# Patient Record
Sex: FEMALE | Race: BLACK OR AFRICAN AMERICAN | NOT HISPANIC OR LATINO | Employment: FULL TIME | URBAN - METROPOLITAN AREA
[De-identification: names, ages, dates, MRNs, and addresses within clinical notes are randomized per-mention and may not be internally consistent; named-entity substitution may affect disease eponyms.]

---

## 2024-11-02 ENCOUNTER — HOSPITAL ENCOUNTER (EMERGENCY)
Facility: HOSPITAL | Age: 54
Discharge: HOME | End: 2024-11-03
Attending: EMERGENCY MEDICINE
Payer: COMMERCIAL

## 2024-11-02 ENCOUNTER — APPOINTMENT (EMERGENCY)
Dept: RADIOLOGY | Facility: HOSPITAL | Age: 54
End: 2024-11-02
Payer: COMMERCIAL

## 2024-11-02 VITALS
WEIGHT: 215.61 LBS | DIASTOLIC BLOOD PRESSURE: 129 MMHG | TEMPERATURE: 98 F | RESPIRATION RATE: 18 BRPM | SYSTOLIC BLOOD PRESSURE: 251 MMHG | HEART RATE: 73 BPM | OXYGEN SATURATION: 99 %

## 2024-11-02 DIAGNOSIS — M77.8 TENDINITIS OF HAND: ICD-10-CM

## 2024-11-02 DIAGNOSIS — M79.641 RIGHT HAND PAIN: ICD-10-CM

## 2024-11-02 DIAGNOSIS — I10 HYPERTENSION, UNSPECIFIED TYPE: Primary | ICD-10-CM

## 2024-11-02 PROCEDURE — 99284 EMERGENCY DEPT VISIT MOD MDM: CPT | Performed by: EMERGENCY MEDICINE

## 2024-11-02 PROCEDURE — 99283 EMERGENCY DEPT VISIT LOW MDM: CPT

## 2024-11-02 PROCEDURE — 73130 X-RAY EXAM OF HAND: CPT

## 2024-11-02 RX ORDER — AMLODIPINE BESYLATE 5 MG/1
5 TABLET ORAL ONCE
Status: COMPLETED | OUTPATIENT
Start: 2024-11-02 | End: 2024-11-02

## 2024-11-02 RX ORDER — HYDROCODONE BITARTRATE AND ACETAMINOPHEN 5; 325 MG/1; MG/1
1 TABLET ORAL ONCE
Status: COMPLETED | OUTPATIENT
Start: 2024-11-02 | End: 2024-11-02

## 2024-11-02 RX ADMIN — HYDROCODONE BITARTRATE AND ACETAMINOPHEN 1 TABLET: 5; 325 TABLET ORAL at 23:48

## 2024-11-02 RX ADMIN — AMLODIPINE BESYLATE 5 MG: 5 TABLET ORAL at 23:48

## 2024-11-02 NOTE — Clinical Note
Nuvia Marina was seen and treated in our emergency department on 11/2/2024.                Diagnosis:     Nuvia  may return to work on return date.    She may return on this date: 11/06/2024         If you have any questions or concerns, please don't hesitate to call.      Deysi Garcia MD    ______________________________           _______________          _______________  Hospital Representative                              Date                                Time

## 2024-11-03 RX ORDER — OXYCODONE HYDROCHLORIDE 5 MG/1
5 TABLET ORAL EVERY 6 HOURS PRN
Qty: 12 TABLET | Refills: 0 | Status: SHIPPED | OUTPATIENT
Start: 2024-11-03 | End: 2024-11-13

## 2024-11-03 RX ORDER — PREDNISONE 20 MG/1
40 TABLET ORAL DAILY
Qty: 10 TABLET | Refills: 0 | Status: SHIPPED | OUTPATIENT
Start: 2024-11-03 | End: 2024-11-08

## 2024-11-03 NOTE — DISCHARGE INSTRUCTIONS
You were seen and evaluated today for hand pain.  Your test results demonstrated normal XR  Please take all medications as instructed. Follow up with your PCP as discussed.   RETURN TO THE EMERGENCY DEPARTMENT if you develop new or worsening symptoms and are unable to see your PCP.

## 2024-11-03 NOTE — ED PROVIDER NOTES
Time reflects when diagnosis was documented in both MDM as applicable and the Disposition within this note       Time User Action Codes Description Comment    11/3/2024  1:20 AM Deysi Gracia [I10] Hypertension, unspecified type     11/3/2024  1:21 AM Deysi Garcia [M77.8] Tendinitis of hand     11/3/2024  1:21 AM Deysi Garcia [M79.641] Right hand pain           ED Disposition       ED Disposition   Discharge    Condition   Stable    Date/Time   Sun Nov 3, 2024  1:21 AM    Comment   Nuvia Marina discharge to home/self care.                   Assessment & Plan       Medical Decision Making  Patient is a pleasant 54-year-old female who presents to the emergency department with a complaint of right hand pain and injury as well as hypertension.  She is denies any injury or trauma but states that she was out using a leaf blower which is an unusual activity for her today.  Her  supplements the history and states that she was doing work around the home and yard all day today.  Range of motion in the right thumb is limited due to pain.  She denies overt injury or trauma.  Strongly suspect tendinitis versus tenosynovitis.  Will obtain x-ray to evaluate for bony injury though unlikely based on the history.  Mechanism not suggestive of ligamentous rupture.    Patient also notes hypertension above her baseline.  She states that she does typically live in the 170s to 180s systolic.  This is despite compliance with multiple antihypertensive medications.  Strongly suspect pain is contributing to her hypertension.  She did take a dose of her home metoprolol prior to presentation in the emergency department.  Will provide analgesia as well as an extra dose of home amlodipine.  She denies any symptoms suggestive of hypertensive urgency or emergency.    Amount and/or Complexity of Data Reviewed  Radiology: ordered and independent interpretation performed.     Details: No fracture  identified.     Risk  Prescription drug management.  Decision regarding hospitalization.        ED Course as of 11/03/24 0159   Sat Nov 02, 2024   2342 Blood Pressure(!): 251/129  Improved to 226 systolic on my eval.   Patient notes baseline 170-180s systolic despite 4 medications.    Sun Nov 03, 2024   0121 Prolonged discussion re HTN with patient. Denies any symptoms suggestive of hypertensive urgency or emergency.   Strict return precautions provided.    0127 Thumb spica applied       Medications   HYDROcodone-acetaminophen (NORCO) 5-325 mg per tablet 1 tablet (1 tablet Oral Given 11/2/24 2348)   amLODIPine (NORVASC) tablet 5 mg (5 mg Oral Given 11/2/24 2348)       ED Risk Strat Scores                                               History of Present Illness       Chief Complaint   Patient presents with    Hand Injury     Pt was using a leaf blower today around 2pm and after noticed that her right thumb was swollen and painful. Pt used biofreeze on it and it helped for a couple of hours, but it has gotten worse now.    Hypertension       Past Medical History:   Diagnosis Date    Hypertelorism       History reviewed. No pertinent surgical history.   History reviewed. No pertinent family history.   Social History     Tobacco Use    Smoking status: Never    Smokeless tobacco: Never   Vaping Use    Vaping status: Never Used   Substance Use Topics    Alcohol use: Never    Drug use: Never      E-Cigarette/Vaping    E-Cigarette Use Never User       E-Cigarette/Vaping Substances      I have reviewed and agree with the history as documented.     The patient is a 54 yoF w/ hand pain and high blood pressure increased from her baseline.           Review of Systems   Musculoskeletal:  Positive for joint swelling.   Skin:  Negative for color change, pallor, rash and wound.   Neurological:  Negative for weakness and numbness.           Objective       ED Triage Vitals   Temperature Pulse Blood Pressure Respirations SpO2 Patient  Position - Orthostatic VS   11/02/24 2257 11/02/24 2257 11/02/24 2257 11/02/24 2257 11/02/24 2257 11/02/24 2257   98 °F (36.7 °C) 73 (!) 251/129 18 99 % Sitting      Temp Source Heart Rate Source BP Location FiO2 (%) Pain Score    11/02/24 2257 11/02/24 2257 11/02/24 2257 -- 11/02/24 2348    Oral Monitor Left arm  7      Vitals      Date and Time Temp Pulse SpO2 Resp BP Pain Score FACES Pain Rating User   11/02/24 2348 -- -- -- -- -- 7 -- CHAD   11/02/24 2257 98 °F (36.7 °C) 73 99 % 18 251/129 -- -- WT            Physical Exam  Vitals and nursing note reviewed.   Constitutional:       General: She is not in acute distress.     Appearance: Normal appearance.   HENT:      Head: Normocephalic and atraumatic.      Right Ear: External ear normal.      Left Ear: External ear normal.      Nose: Nose normal.   Cardiovascular:      Rate and Rhythm: Normal rate and regular rhythm.      Pulses: Normal pulses.   Pulmonary:      Effort: Pulmonary effort is normal. No respiratory distress.   Musculoskeletal:      Right hand: Swelling, tenderness and bony tenderness present. Decreased range of motion. Normal sensation. There is no disruption of two-point discrimination. Normal capillary refill. Normal pulse.   Skin:     General: Skin is warm and dry.   Neurological:      General: No focal deficit present.      Mental Status: She is alert and oriented to person, place, and time.   Psychiatric:         Behavior: Behavior normal.         Results Reviewed       None            XR hand 3+ views RIGHT    (Results Pending)       Procedures    ED Medication and Procedure Management   None     Patient's Medications   Discharge Prescriptions    OXYCODONE (ROXICODONE) 5 IMMEDIATE RELEASE TABLET    Take 1 tablet (5 mg total) by mouth every 6 (six) hours as needed for moderate pain for up to 10 days Max Daily Amount: 20 mg       Start Date: 11/3/2024 End Date: 11/13/2024       Order Dose: 5 mg       Quantity: 12 tablet    Refills: 0     PREDNISONE 20 MG TABLET    Take 2 tablets (40 mg total) by mouth daily for 5 days       Start Date: 11/3/2024 End Date: 11/8/2024       Order Dose: 40 mg       Quantity: 10 tablet    Refills: 0       ED SEPSIS DOCUMENTATION   Time reflects when diagnosis was documented in both MDM as applicable and the Disposition within this note       Time User Action Codes Description Comment    11/3/2024  1:20 AM Deysi Garcia [I10] Hypertension, unspecified type     11/3/2024  1:21 AM Deysi Garcia [M77.8] Tendinitis of hand     11/3/2024  1:21 AM Deysi Garcia [M79.641] Right hand pain                  Deysi Garcia MD  11/03/24 0159

## 2024-11-05 ENCOUNTER — OFFICE VISIT (OUTPATIENT)
Dept: OBGYN CLINIC | Facility: CLINIC | Age: 54
End: 2024-11-05
Payer: COMMERCIAL

## 2024-11-05 VITALS — WEIGHT: 215 LBS | BODY MASS INDEX: 30.1 KG/M2 | HEIGHT: 71 IN

## 2024-11-05 DIAGNOSIS — M65.4 DE QUERVAIN'S TENOSYNOVITIS, RIGHT: Primary | ICD-10-CM

## 2024-11-05 DIAGNOSIS — M25.531 PAIN IN RIGHT WRIST: ICD-10-CM

## 2024-11-05 PROCEDURE — 20550 NJX 1 TENDON SHEATH/LIGAMENT: CPT | Performed by: STUDENT IN AN ORGANIZED HEALTH CARE EDUCATION/TRAINING PROGRAM

## 2024-11-05 PROCEDURE — 99204 OFFICE O/P NEW MOD 45 MIN: CPT | Performed by: STUDENT IN AN ORGANIZED HEALTH CARE EDUCATION/TRAINING PROGRAM

## 2024-11-05 RX ORDER — LIDOCAINE HYDROCHLORIDE 10 MG/ML
1 INJECTION, SOLUTION INFILTRATION; PERINEURAL
Status: COMPLETED | OUTPATIENT
Start: 2024-11-05 | End: 2024-11-05

## 2024-11-05 RX ORDER — BETAMETHASONE SODIUM PHOSPHATE AND BETAMETHASONE ACETATE 3; 3 MG/ML; MG/ML
6 INJECTION, SUSPENSION INTRA-ARTICULAR; INTRALESIONAL; INTRAMUSCULAR; SOFT TISSUE
Status: COMPLETED | OUTPATIENT
Start: 2024-11-05 | End: 2024-11-05

## 2024-11-05 RX ORDER — AMLODIPINE BESYLATE 5 MG/1
5 TABLET ORAL DAILY
COMMUNITY

## 2024-11-05 RX ORDER — LOSARTAN POTASSIUM 100 MG/1
TABLET ORAL
COMMUNITY
Start: 2024-10-22

## 2024-11-05 RX ORDER — METOPROLOL TARTRATE 25 MG/1
TABLET, FILM COATED ORAL
COMMUNITY
Start: 2024-10-22

## 2024-11-05 RX ADMIN — LIDOCAINE HYDROCHLORIDE 1 ML: 10 INJECTION, SOLUTION INFILTRATION; PERINEURAL at 13:00

## 2024-11-05 RX ADMIN — BETAMETHASONE SODIUM PHOSPHATE AND BETAMETHASONE ACETATE 6 MG: 3; 3 INJECTION, SUSPENSION INTRA-ARTICULAR; INTRALESIONAL; INTRAMUSCULAR; SOFT TISSUE at 13:00

## 2024-11-05 NOTE — PROGRESS NOTES
ORTHOPAEDIC HAND, WRIST, AND ELBOW OFFICE  VISIT     ASSESSMENT/PLAN:    Nuvia Marina is a 54 y.o. RHD female who presents with De Quervain's tenosynovitis of the right wrist s/p CSI 11/5/24    -  We discussed the natural history and etiology of this condition detail. We discussed the utility of therapy vs. injection vs. surgery. In this case, the risks of surgery involve infection, persistent pain, and recurrence of the condition.       -  The patient elected to receive a steroid injection as per procedure note below.  The patient can continue use of the brace provided by the ED for an additional 2 weeks. After 2 weeks, they may wear it at night for sleeping.    -  The patient will follow up with us on a as needed basis should the symptoms worsen or persist.             The patient verbalized understanding of exam findings and treatment plan. We engaged in the shared decision-making process and treatment options were discussed at length with the patient. Surgical and conservative management discussed today along with risks and benefits.    Follow Up:  PRN      General Discussions:  De Quervain Tenosynovitis: The anatomy and physiology of de Quervain's tenosynovitis was discussed with the patient today in the office.  Edema and increased contact pressure within the first dorsal extensor compartment at the radial styloid can cause pain, crepitation, and limitation of function.  Treatment options include resting thumb spica splints to decrease edema, oral anti-inflammatory medications, home or formal therapy exercises, up to 2 steroid injections within the first dorsal extensor compartment, or surgical release.  While majority of patients do respond to conservative treatment, up to 20% may require surgical release.       Operative Discussions:  De Quervain Release:   The anatomy and physiology of de Quervain's tenosynovitis was discussed with the patient today in the office.  Edema and increased contact  pressure within the first dorsal extensor compartment at the radial styloid can cause pain, crepitation, and limitation of function.  Treatment options include resting thumb spica splints to decrease edema, oral anti-inflammatory medications, home or formal therapy exercises, up to 2 steroid injections within the first dorsal extensor compartment, or surgical release.  While majority of patients do respond to conservative treatment, up to 20% may require surgical release. The patient has elected to undergo a release of the first dorsal extensor compartment (de Quervain's).  A small incision will be made over the radial styloid of the wrist, the tendon sheath holding the abductor pollicis longus and extensor pollicis brevis will be released.  In the postoperative period, light activities are allowed immediately, driving is allowed when narcotic medication has stopped, and the incision may get wet after 2 days.  Heavy activities (lifting more than approximately 10 pounds) will be allowed after the follow up appointment in 1-2 weeks.  While the pain and discomfort within the wrist typically improves rapidly, some residual discomfort may be present for up to 6 weeks.  The patient may notice temporary numbness within the superficial sensory branch of the radial nerve secondary to a traction neuropraxia.  This is often a self-limiting condition.  The patient has an understanding of the above mentioned discussion.  Approximate success rate is 98%.The risks and benefits of the procedure were explained to the patient, which include, but are not limited to: Bleeding, infection, recurrence, pain, scar, damage to tendons, damage to nerves, and damage to blood vessels, failure to give desired results and complications related to anesthesia.  These risks, along with alternative conservative treatment options, and postoperative protocols were voiced back and understood by the patient.  All questions were answered to the patient's  satisfaction.  The patient agrees to comply with a standard postoperative protocol, and is willing to proceed.  Education was provided via written and auditory forms.  There were no barriers to learning. Written handouts regarding wound care, incision and scar care, and general preoperative information was provided to the patient.  Prior to surgery, the patient may be requested to stop all anti-inflammatory medications.  Prophylactic aspirin, Plavix, and Coumadin may be allowed to be continued.  Medications including vitamin E., ginkgo, and fish oil are requested to be stopped approximately one week prior to surgery.  Hypertensive medications and beta blockers, if taken, should be continued.      ____________________________________________________________________________________________________________________________________________      CHIEF COMPLAINT:  Right hand pain    SUBJECTIVE:  Nuvia Marina is a 54 y.o. female who presents with sharp pain in the right wrist.  This started 3 days ago as Sudden without injury.  Movement of the right thumb aggravates her wrist pain.  Radiation: Yes to the  forearm  Previous Treatments: NSAIDs, activity modification, and bracing with relief  Associated symptoms: None  Handedness: right  Work status: Nurse    I have personally reviewed all the relevant PMH, PSH, SH, FH, Medications and allergies      PAST MEDICAL HISTORY:  Past Medical History:   Diagnosis Date    Hypertelorism        PAST SURGICAL HISTORY:  History reviewed. No pertinent surgical history.    FAMILY HISTORY:  History reviewed. No pertinent family history.    SOCIAL HISTORY:  Social History     Tobacco Use    Smoking status: Never    Smokeless tobacco: Never   Vaping Use    Vaping status: Never Used   Substance Use Topics    Alcohol use: Never    Drug use: Never       MEDICATIONS:    Current Outpatient Medications:     amLODIPine (NORVASC) 5 mg tablet, Take 5 mg by mouth daily, Disp: , Rfl:     losartan  "(COZAAR) 100 MG tablet, , Disp: , Rfl:     metoprolol tartrate (LOPRESSOR) 25 mg tablet, , Disp: , Rfl:     oxyCODONE (Roxicodone) 5 immediate release tablet, Take 1 tablet (5 mg total) by mouth every 6 (six) hours as needed for moderate pain for up to 10 days Max Daily Amount: 20 mg, Disp: 12 tablet, Rfl: 0    predniSONE 20 mg tablet, Take 2 tablets (40 mg total) by mouth daily for 5 days, Disp: 10 tablet, Rfl: 0    ALLERGIES:  No Known Allergies        REVIEW OF SYSTEMS:  Pertinent items are noted in HPI.  A comprehensive review of systems was negative.    VITALS:  There were no vitals filed for this visit.    LABS:  HgA1c: No results found for: \"HGBA1C\"  BMP: No results found for: \"GLUCOSE\", \"CALCIUM\", \"NA\", \"K\", \"CO2\", \"CL\", \"BUN\", \"CREATININE\"    _____________________________________________________  PHYSICAL EXAMINATION:  General: well developed and well nourished, alert, oriented times 3, and appears comfortable  Psychiatric: Normal  HEENT: Normocephalic, Atraumatic Trachea Midline, No torticollis  Pulmonary: No audible wheezing or respiratory distress   Abdomen/GI: Non tender, non distended   Cardiovascular: No pitting edema, 2+ radial pulse   Skin: No masses, erythema, lacerations, fluctation, ulcerations  Neurovascular: Sensation Intact to the Median, Ulnar, Radial Nerve, Motor Intact to the Median, Ulnar, Radial Nerve, and Pulses Intact  Musculoskeletal: Normal, except as noted in detailed exam and in HPI.        FOCUSED MUSCULOSKELETAL EXAMINATION:  Right Upper Extremity  Inspection: skin intact, no notable deformity   Palpation: first dorsal extensor compartment  Neurologic: 5/5 elbow flexion, 5/5 elbow extension, 5/5 wrist extension, 5/5 wrist flexion, 5/5 finger flexion, 5/5 finger extension, 5/5 FPL, 5/5 EPL, 5/5 APB, 5/5 intrinsics, sensation intact to median, radial, and ulnar nerve distributions  Vascular: Palpable radial pulse, brisk cap refill <2sec, hand warm and well perfused  MSK:   MMT: 5/5 " "throughout  Mild soft tissue swelling within the first dorsal extensor compartment  Full FDS, FDP, extensor mechanisms are intact  Positive Finklestein  Demonstrates normal wrist, elbow, and shoulder motion  Forearm compartments are soft and supple  2+ Distal radial pulse with brisk capillary refill to the fingers  Radial, median, ulnar motor and sensory distribution intact  Sensation to light touch intact distally   ___________________________________________________  STUDIES REVIEWED:  Xrays of the right hand were obtained on 11/2/2024 were independently reviewed which demonstrates no acute osseous abnormalities or significant degenerative changes.      LABS REVIEWED:    HgA1c: No results found for: \"HGBA1C\"  BMP: No results found for: \"GLUCOSE\", \"CALCIUM\", \"NA\", \"K\", \"CO2\", \"CL\", \"BUN\", \"CREATININE\"            PROCEDURES PERFORMED:  Hand/upper extremity injection: R extensor compartment 1  Universal Protocol:  Consent: Verbal consent obtained.  Risks and benefits: risks, benefits and alternatives were discussed  Consent given by: patient  Time out: Immediately prior to procedure a \"time out\" was called to verify the correct patient, procedure, equipment, support staff and site/side marked as required.  Patient understanding: patient states understanding of the procedure being performed  Site marked: the operative site was marked  Patient identity confirmed: verbally with patient  Supporting Documentation  Indications: pain and therapeutic   Procedure Details  Condition:de Quervain's tenosynovitis Site: R extensor compartment 1   Preparation: Patient was prepped and draped in the usual sterile fashion  Needle size: 25 G  Approach: dorsal  Medications administered: 1 mL lidocaine 1 %; 6 mg betamethasone acetate-betamethasone sodium phosphate 6 (3-3) mg/mL  Patient tolerance: patient tolerated the procedure well with no immediate complications  Dressing:  Sterile dressing applied   "         _____________________________________________________      Scribe Attestation      I,:  Charity Senait am acting as a scribe while in the presence of the attending physician.:       I,:  Jose Naidu MD personally performed the services described in this documentation    as scribed in my presence.:               I agree with the history, physical examination, assessment and plan of care as documented above.    Jose Naidu M.D.  Attending, Orthopaedic Surgery  Hand, Wrist, and Elbow Surgery  [facility]

## 2024-11-05 NOTE — LETTER
November 5, 2024     Patient: Nuvia Marina  YOB: 1970  Date of Visit: 11/5/2024      To Whom it May Concern:    Nuvia Marina is under my professional care. Nuvia was seen in my office on 11/5/2024. Nuvia may return to work on 11/11/2024 .    If you have any questions or concerns, please don't hesitate to call.         Sincerely,          Jose Naidu MD        CC: No Recipients

## 2025-02-22 ENCOUNTER — HOSPITAL ENCOUNTER (INPATIENT)
Facility: HOSPITAL | Age: 55
LOS: 2 days | Discharge: HOME | End: 2025-02-24
Attending: STUDENT IN AN ORGANIZED HEALTH CARE EDUCATION/TRAINING PROGRAM | Admitting: HOSPITALIST
Payer: COMMERCIAL

## 2025-02-22 ENCOUNTER — APPOINTMENT (EMERGENCY)
Dept: RADIOLOGY | Facility: HOSPITAL | Age: 55
End: 2025-02-22
Payer: COMMERCIAL

## 2025-02-22 ENCOUNTER — APPOINTMENT (EMERGENCY)
Dept: CT IMAGING | Facility: HOSPITAL | Age: 55
End: 2025-02-22
Payer: COMMERCIAL

## 2025-02-22 DIAGNOSIS — M65.90 TENOSYNOVITIS: ICD-10-CM

## 2025-02-22 DIAGNOSIS — L03.90 CELLULITIS: ICD-10-CM

## 2025-02-22 DIAGNOSIS — M65.949 FLEXOR TENOSYNOVITIS OF FINGER: ICD-10-CM

## 2025-02-22 DIAGNOSIS — M79.89 HAND SWELLING: Primary | ICD-10-CM

## 2025-02-22 LAB
ALBUMIN SERPL BCG-MCNC: 4.4 G/DL (ref 3.5–5)
ALP SERPL-CCNC: 106 U/L (ref 34–104)
ALT SERPL W P-5'-P-CCNC: 21 U/L (ref 7–52)
ANION GAP SERPL CALCULATED.3IONS-SCNC: 7 MMOL/L (ref 4–13)
AST SERPL W P-5'-P-CCNC: 23 U/L (ref 13–39)
BASOPHILS # BLD AUTO: 0.07 THOUSANDS/ΜL (ref 0–0.1)
BASOPHILS NFR BLD AUTO: 1 % (ref 0–1)
BILIRUB SERPL-MCNC: 0.65 MG/DL (ref 0.2–1)
BUN SERPL-MCNC: 19 MG/DL (ref 5–25)
CALCIUM SERPL-MCNC: 10.3 MG/DL (ref 8.4–10.2)
CHLORIDE SERPL-SCNC: 103 MMOL/L (ref 96–108)
CO2 SERPL-SCNC: 29 MMOL/L (ref 21–32)
CREAT SERPL-MCNC: 1.11 MG/DL (ref 0.6–1.3)
EOSINOPHIL # BLD AUTO: 0.13 THOUSAND/ΜL (ref 0–0.61)
EOSINOPHIL NFR BLD AUTO: 2 % (ref 0–6)
ERYTHROCYTE [DISTWIDTH] IN BLOOD BY AUTOMATED COUNT: 13.5 % (ref 11.6–15.1)
GFR SERPL CREATININE-BSD FRML MDRD: 56 ML/MIN/1.73SQ M
GLUCOSE SERPL-MCNC: 88 MG/DL (ref 65–140)
HCT VFR BLD AUTO: 37.3 % (ref 34.8–46.1)
HGB BLD-MCNC: 11.9 G/DL (ref 11.5–15.4)
IMM GRANULOCYTES # BLD AUTO: 0.01 THOUSAND/UL (ref 0–0.2)
IMM GRANULOCYTES NFR BLD AUTO: 0 % (ref 0–2)
LACTATE SERPL-SCNC: 0.9 MMOL/L (ref 0.5–2)
LYMPHOCYTES # BLD AUTO: 2.46 THOUSANDS/ΜL (ref 0.6–4.47)
LYMPHOCYTES NFR BLD AUTO: 32 % (ref 14–44)
MCH RBC QN AUTO: 25.8 PG (ref 26.8–34.3)
MCHC RBC AUTO-ENTMCNC: 31.9 G/DL (ref 31.4–37.4)
MCV RBC AUTO: 81 FL (ref 82–98)
MONOCYTES # BLD AUTO: 0.58 THOUSAND/ΜL (ref 0.17–1.22)
MONOCYTES NFR BLD AUTO: 8 % (ref 4–12)
NEUTROPHILS # BLD AUTO: 4.44 THOUSANDS/ΜL (ref 1.85–7.62)
NEUTS SEG NFR BLD AUTO: 57 % (ref 43–75)
NRBC BLD AUTO-RTO: 0 /100 WBCS
PLATELET # BLD AUTO: 333 THOUSANDS/UL (ref 149–390)
PMV BLD AUTO: 9.4 FL (ref 8.9–12.7)
POTASSIUM SERPL-SCNC: 3.8 MMOL/L (ref 3.5–5.3)
PROCALCITONIN SERPL-MCNC: <0.05 NG/ML
PROT SERPL-MCNC: 7.8 G/DL (ref 6.4–8.4)
RBC # BLD AUTO: 4.62 MILLION/UL (ref 3.81–5.12)
SODIUM SERPL-SCNC: 139 MMOL/L (ref 135–147)
WBC # BLD AUTO: 7.69 THOUSAND/UL (ref 4.31–10.16)

## 2025-02-22 PROCEDURE — 36415 COLL VENOUS BLD VENIPUNCTURE: CPT | Performed by: STUDENT IN AN ORGANIZED HEALTH CARE EDUCATION/TRAINING PROGRAM

## 2025-02-22 PROCEDURE — 80053 COMPREHEN METABOLIC PANEL: CPT | Performed by: STUDENT IN AN ORGANIZED HEALTH CARE EDUCATION/TRAINING PROGRAM

## 2025-02-22 PROCEDURE — 96375 TX/PRO/DX INJ NEW DRUG ADDON: CPT

## 2025-02-22 PROCEDURE — 73130 X-RAY EXAM OF HAND: CPT

## 2025-02-22 PROCEDURE — 99284 EMERGENCY DEPT VISIT MOD MDM: CPT

## 2025-02-22 PROCEDURE — 84145 PROCALCITONIN (PCT): CPT | Performed by: STUDENT IN AN ORGANIZED HEALTH CARE EDUCATION/TRAINING PROGRAM

## 2025-02-22 PROCEDURE — 83605 ASSAY OF LACTIC ACID: CPT | Performed by: STUDENT IN AN ORGANIZED HEALTH CARE EDUCATION/TRAINING PROGRAM

## 2025-02-22 PROCEDURE — 99285 EMERGENCY DEPT VISIT HI MDM: CPT | Performed by: STUDENT IN AN ORGANIZED HEALTH CARE EDUCATION/TRAINING PROGRAM

## 2025-02-22 PROCEDURE — 96374 THER/PROPH/DIAG INJ IV PUSH: CPT

## 2025-02-22 PROCEDURE — 73201 CT UPPER EXTREMITY W/DYE: CPT

## 2025-02-22 PROCEDURE — 85025 COMPLETE CBC W/AUTO DIFF WBC: CPT | Performed by: STUDENT IN AN ORGANIZED HEALTH CARE EDUCATION/TRAINING PROGRAM

## 2025-02-22 RX ORDER — KETOROLAC TROMETHAMINE 30 MG/ML
15 INJECTION, SOLUTION INTRAMUSCULAR; INTRAVENOUS ONCE
Status: COMPLETED | OUTPATIENT
Start: 2025-02-22 | End: 2025-02-22

## 2025-02-22 RX ADMIN — AMPICILLIN AND SULBACTAM 3 G: 10; 5 INJECTION, POWDER, FOR SOLUTION INTRAVENOUS at 22:47

## 2025-02-22 RX ADMIN — KETOROLAC TROMETHAMINE 15 MG: 30 INJECTION, SOLUTION INTRAMUSCULAR; INTRAVENOUS at 17:07

## 2025-02-22 NOTE — LETTER
[facility] 2ND FLOOR MED SURG UNIT  [address]  [place] [address]  Dept: [phone]    February 24, 2025     Patient: Nuvia Marina   YOB: 1970   Date of Visit: 2/22/2025       To Whom it May Concern:    Nuvia Marina is under my professional care. She was seen in the hospital from 2/22/2025 to 02/24/25. She may return to work on 2/28 or sooner if able without limitations.    If you have any questions or concerns, please don't hesitate to call.         Sincerely,          Michael Payan MD

## 2025-02-23 PROBLEM — I10 HTN (HYPERTENSION): Status: ACTIVE | Noted: 2020-11-09

## 2025-02-23 PROBLEM — M65.90 TENOSYNOVITIS: Status: ACTIVE | Noted: 2025-02-23

## 2025-02-23 PROBLEM — L03.90 CELLULITIS: Status: ACTIVE | Noted: 2025-02-23

## 2025-02-23 LAB
ANION GAP SERPL CALCULATED.3IONS-SCNC: 8 MMOL/L (ref 4–13)
BUN SERPL-MCNC: 18 MG/DL (ref 5–25)
CALCIUM SERPL-MCNC: 10.6 MG/DL (ref 8.4–10.2)
CHLORIDE SERPL-SCNC: 103 MMOL/L (ref 96–108)
CO2 SERPL-SCNC: 29 MMOL/L (ref 21–32)
CREAT SERPL-MCNC: 1.09 MG/DL (ref 0.6–1.3)
ERYTHROCYTE [DISTWIDTH] IN BLOOD BY AUTOMATED COUNT: 13.3 % (ref 11.6–15.1)
GFR SERPL CREATININE-BSD FRML MDRD: 57 ML/MIN/1.73SQ M
GLUCOSE SERPL-MCNC: 101 MG/DL (ref 65–140)
HCT VFR BLD AUTO: 38.4 % (ref 34.8–46.1)
HGB BLD-MCNC: 12.4 G/DL (ref 11.5–15.4)
MCH RBC QN AUTO: 25.8 PG (ref 26.8–34.3)
MCHC RBC AUTO-ENTMCNC: 32.3 G/DL (ref 31.4–37.4)
MCV RBC AUTO: 80 FL (ref 82–98)
PLATELET # BLD AUTO: 365 THOUSANDS/UL (ref 149–390)
PMV BLD AUTO: 9.7 FL (ref 8.9–12.7)
POTASSIUM SERPL-SCNC: 3.8 MMOL/L (ref 3.5–5.3)
RBC # BLD AUTO: 4.81 MILLION/UL (ref 3.81–5.12)
SODIUM SERPL-SCNC: 140 MMOL/L (ref 135–147)
WBC # BLD AUTO: 6.55 THOUSAND/UL (ref 4.31–10.16)

## 2025-02-23 PROCEDURE — 99222 1ST HOSP IP/OBS MODERATE 55: CPT | Performed by: PHYSICIAN ASSISTANT

## 2025-02-23 PROCEDURE — 85027 COMPLETE CBC AUTOMATED: CPT

## 2025-02-23 PROCEDURE — 99223 1ST HOSP IP/OBS HIGH 75: CPT

## 2025-02-23 PROCEDURE — 80048 BASIC METABOLIC PNL TOTAL CA: CPT

## 2025-02-23 RX ORDER — HYDROXYZINE HYDROCHLORIDE 25 MG/1
25 TABLET, FILM COATED ORAL EVERY 6 HOURS PRN
Status: DISCONTINUED | OUTPATIENT
Start: 2025-02-23 | End: 2025-02-24 | Stop reason: HOSPADM

## 2025-02-23 RX ORDER — HYDROCHLOROTHIAZIDE 25 MG/1
25 TABLET ORAL 3 TIMES DAILY
Status: DISCONTINUED | OUTPATIENT
Start: 2025-02-23 | End: 2025-02-24 | Stop reason: HOSPADM

## 2025-02-23 RX ORDER — ACETAMINOPHEN 325 MG/1
650 TABLET ORAL EVERY 6 HOURS SCHEDULED
Status: DISCONTINUED | OUTPATIENT
Start: 2025-02-23 | End: 2025-02-24 | Stop reason: HOSPADM

## 2025-02-23 RX ORDER — HYDROCHLOROTHIAZIDE 25 MG/1
25 TABLET ORAL 3 TIMES DAILY
Status: DISCONTINUED | OUTPATIENT
Start: 2025-02-23 | End: 2025-02-23

## 2025-02-23 RX ORDER — AMLODIPINE BESYLATE 5 MG/1
5 TABLET ORAL DAILY
Status: DISCONTINUED | OUTPATIENT
Start: 2025-02-23 | End: 2025-02-24 | Stop reason: HOSPADM

## 2025-02-23 RX ORDER — VANCOMYCIN HYDROCHLORIDE 750 MG/150ML
750 INJECTION, SOLUTION INTRAVENOUS EVERY 12 HOURS
Status: DISCONTINUED | OUTPATIENT
Start: 2025-02-23 | End: 2025-02-24

## 2025-02-23 RX ORDER — HYDROCHLOROTHIAZIDE 25 MG/1
25 TABLET ORAL 3 TIMES DAILY
COMMUNITY
Start: 2024-11-16

## 2025-02-23 RX ORDER — HYDROMORPHONE HCL IN WATER/PF 6 MG/30 ML
0.2 PATIENT CONTROLLED ANALGESIA SYRINGE INTRAVENOUS EVERY 6 HOURS PRN
Status: DISCONTINUED | OUTPATIENT
Start: 2025-02-23 | End: 2025-02-24 | Stop reason: HOSPADM

## 2025-02-23 RX ORDER — ACETAMINOPHEN 325 MG/1
650 TABLET ORAL EVERY 6 HOURS PRN
Status: DISCONTINUED | OUTPATIENT
Start: 2025-02-23 | End: 2025-02-23

## 2025-02-23 RX ORDER — OXYCODONE HYDROCHLORIDE 5 MG/1
5 TABLET ORAL EVERY 6 HOURS PRN
Refills: 0 | Status: DISCONTINUED | OUTPATIENT
Start: 2025-02-23 | End: 2025-02-24 | Stop reason: HOSPADM

## 2025-02-23 RX ORDER — ENALAPRIL MALEATE 20 MG/1
20 TABLET ORAL DAILY
COMMUNITY

## 2025-02-23 RX ORDER — LISINOPRIL 20 MG/1
40 TABLET ORAL DAILY
Status: DISCONTINUED | OUTPATIENT
Start: 2025-02-23 | End: 2025-02-24 | Stop reason: HOSPADM

## 2025-02-23 RX ADMIN — CEFTRIAXONE SODIUM 1000 MG: 10 INJECTION, POWDER, FOR SOLUTION INTRAVENOUS at 09:31

## 2025-02-23 RX ADMIN — AMLODIPINE BESYLATE 5 MG: 5 TABLET ORAL at 09:21

## 2025-02-23 RX ADMIN — HYDROCHLOROTHIAZIDE 25 MG: 25 TABLET ORAL at 21:14

## 2025-02-23 RX ADMIN — HYDROCHLOROTHIAZIDE 25 MG: 25 TABLET ORAL at 09:21

## 2025-02-23 RX ADMIN — VANCOMYCIN HYDROCHLORIDE 1250 MG: 1 INJECTION, POWDER, LYOPHILIZED, FOR SOLUTION INTRAVENOUS at 00:57

## 2025-02-23 RX ADMIN — LISINOPRIL 40 MG: 20 TABLET ORAL at 09:21

## 2025-02-23 RX ADMIN — VANCOMYCIN HYDROCHLORIDE 750 MG: 750 INJECTION, SOLUTION INTRAVENOUS at 15:00

## 2025-02-23 RX ADMIN — ACETAMINOPHEN 650 MG: 325 TABLET, FILM COATED ORAL at 18:17

## 2025-02-23 RX ADMIN — HYDROCHLOROTHIAZIDE 25 MG: 25 TABLET ORAL at 16:18

## 2025-02-23 RX ADMIN — ACETAMINOPHEN 650 MG: 325 TABLET, FILM COATED ORAL at 12:36

## 2025-02-23 NOTE — ASSESSMENT & PLAN NOTE
"Presents to ED due to worsening swelling and pain in left hand since Monday  Was seen by PCP on Wednesday and prescribed p.o. Augmentin, reports to ED today due to worsening of pain/swelling. Denies fever/chills  CT left upper extremity revealing \"Tenosynovitis of the fourth extensor compartment. Subcutaneous edema along the dorsum of the hand suggesting cellulitis.\"  BP elevated since arrival otherwise VSS, labs WNL  Per ER report, hand surgery recommending admission for treatment of infection with a.m. consult  Received 3 g IV Unasyn in ED; will initiate IV ceftriaxone and vancomycin  Diet n.p.o., will defer VTE prophylaxis at this time until decision is made if patient will require surgery in a.m.  Tylenol for pain/fever, oxycodone 2.5mg/5mg for moderate/severe, dilaudid 0.2mg for breakthrough, pain control precautions initiated    "

## 2025-02-23 NOTE — PROGRESS NOTES
Nuvia Marina is a 55 y.o. female who is currently ordered Vancomycin IV with management by the Pharmacy Consult service.  Relevant clinical data and objective / subjective history reviewed.  Vancomycin Assessment:  Indication and Goal AUC/Trough: Soft tissue (goal -600, trough >10), -600, trough >10  Clinical Status:  New start  Micro:   pending  Renal Function:  SCr: 1.11 mg/dL  CrCl: 73.8 mL/min  Renal replacement: Not on dialysis  Days of Therapy: 1  Current Dose: 1250mg Loading dose  Vancomycin Plan:  New Dosinmg Q12H  Estimated AUC: 407 mcg*hr/mL  Estimated Trough: 12.7 mcg/mL  Next Level: 25  Renal Function Monitoring: Daily BMP and UOP  Pharmacy will continue to follow closely for s/sx of nephrotoxicity, infusion reactions and appropriateness of therapy.  BMP and CBC will be ordered per protocol. We will continue to follow the patient’s culture results and clinical progress daily.    Dwayne Baires, Pharmacist

## 2025-02-23 NOTE — ASSESSMENT & PLAN NOTE
Left hand extensor tenosynovitis with underlying cellulitis  It was discussed with the patient to continue with IV antibiotics as there was improvement in the swelling since her hospital stay  Aqua K-pad can be applied to the area  She was encouraged to work on range of motion of the digits as well as the wrist  Analgesics as per primary team  DVT prophylaxis as per primary team  Dispo: Orthopedics will continue to follow, if she continues to have improvement, she can be discharged with follow-up with hand surgery

## 2025-02-23 NOTE — CONSULTS
Consultation - Orthopedics   Name: Nuvia Marina 55 y.o. female I MRN: [id number]  Unit/Bed#: -01 I Date of Admission: 2/22/2025   Date of Service: 2/23/2025 I Hospital Day: 1   Inpatient consult to Orthopedic Surgery  Consult performed by: Olu Davis PA-C  Consult ordered by: Michael Payan MD      Inpatient consult to Hand Surgery  Consult performed by: Olu Davis PA-C  Consult ordered by: Ruby Dudley PA-C        Physician Requesting Evaluation: Michael Payan MD   Reason for Evaluation / Principal Problem: Left wrist tenosynovitis    Assessment & Plan  Tenosynovitis  Left hand extensor tenosynovitis with underlying cellulitis  It was discussed with the patient to continue with IV antibiotics as there was improvement in the swelling since her hospital stay  Aqua K-pad can be applied to the area  She was encouraged to work on range of motion of the digits as well as the wrist  Analgesics as per primary team  DVT prophylaxis as per primary team  Dispo: Orthopedics will continue to follow, if she continues to have improvement, she can be discharged with follow-up with hand surgery  HTN (hypertension)    Cellulitis    Please contact the SecureChat role for the Orthopedics service with any questions/concerns.    History of Present Illness   HPI: Nuvia Marina is a 55 y.o. year old female who presents with a new onset of left hand swelling.  She states initially it started as a bump over the top of her hand about a week ago.  She did go see her PCP which placed her on Augmentin.  She presented to the emergency room yesterday afternoon as she had worsening of swelling as well as pain in the top of her hand.  She denies any injuries or trauma to the area.  She states progressively the swelling got worse and was instructed to go to the emergency room.  She states this morning she is doing much better and the swelling has gone down.  She denies any chest pain, shortness of breath, nausea,  "vomiting, diarrhea, lightheadedness or dizziness.  She denies any numbness or tingling into the extremity.    Review of Systems significant for findings described in the HPI.  Historical Information   Past Medical History:   Diagnosis Date    Hypertelorism      History reviewed. No pertinent surgical history.  Social History     Tobacco Use    Smoking status: Never    Smokeless tobacco: Never   Vaping Use    Vaping status: Never Used   Substance and Sexual Activity    Alcohol use: Never    Drug use: Never    Sexual activity: Not on file     E-Cigarette/Vaping    E-Cigarette Use Never User      E-Cigarette/Vaping Substances     Family history non-contributory    Objective :  Temp:  [97.6 °F (36.4 °C)-97.9 °F (36.6 °C)] 97.9 °F (36.6 °C)  HR:  [62-75] 64  BP: (154-222)/() 154/96  Resp:  [18-20] 18  SpO2:  [97 %-100 %] 97 %  O2 Device: None (Room air)  Physical ExamOrtho Exam   Musculoskeletal: Left hand  Skin demonstrated no erythema edema or ecchymosis, mild swelling noted over the dorsum of the hand  Tenderness to palpation over the dorsal aspect of the hand at the extensor tendon and the retinaculum  She has slight difficulty making a full composite fist but is able to do so with some pain  Palpable swelling appreciated over the dorsum of the hand which has improved, see image below  Sensation intact to median/radial/ulnar nerve distribution   Motor intact anterior interosseous nerve/posterior interosseous nerve/median/radial/ulnar nerve distributions  2+ radial pulse              Lab Results: I have reviewed the following results:   Recent Labs     02/22/25  1636 02/23/25  0539   WBC 7.69 6.55   HGB 11.9 12.4   HCT 37.3 38.4    365   BUN 19 18   CREATININE 1.11 1.09     Blood Culture: No results found for: \"BLOODCX\"  Wound Culture: No results found for: \"WOUNDCULT\"    Imaging Results Review: I personally reviewed the following image studies in PACS and associated radiology reports: CT left hand and " xray(s). My interpretation of the radiology images/reports is: CT scan of the left hand demonstrated tenosynovitis of the fourth extensor compartment without fluid collection, underlying cellulitis appreciated as well.  X-rays of the left and the right hand were reviewed which demonstrate no acute fractures or dislocations.

## 2025-02-23 NOTE — PLAN OF CARE
Problem: PAIN - ADULT  Goal: Verbalizes/displays adequate comfort level or baseline comfort level  Description: Interventions:  - Encourage patient to monitor pain and request assistance  - Assess pain using appropriate pain scale  - Administer analgesics based on type and severity of pain and evaluate response  - Implement non-pharmacological measures as appropriate and evaluate response  - Consider cultural and social influences on pain and pain management  - Notify physician/advanced practitioner if interventions unsuccessful or patient reports new pain  Outcome: Progressing     Problem: INFECTION - ADULT  Goal: Absence or prevention of progression during hospitalization  Description: INTERVENTIONS:  - Assess and monitor for signs and symptoms of infection  - Monitor lab/diagnostic results  - Monitor all insertion sites, i.e. indwelling lines, tubes, and drains  - Monitor endotracheal if appropriate and nasal secretions for changes in amount and color  - Branscomb appropriate cooling/warming therapies per order  - Administer medications as ordered  - Instruct and encourage patient and family to use good hand hygiene technique  - Identify and instruct in appropriate isolation precautions for identified infection/condition  Outcome: Progressing  Goal: Absence of fever/infection during neutropenic period  Description: INTERVENTIONS:  - Monitor WBC    Outcome: Progressing     Problem: SAFETY ADULT  Goal: Patient will remain free of falls  Description: INTERVENTIONS:  - Educate patient/family on patient safety including physical limitations  - Instruct patient to call for assistance with activity   - Consult OT/PT to assist with strengthening/mobility   - Keep Call bell within reach  - Keep bed low and locked with side rails adjusted as appropriate  - Keep care items and personal belongings within reach  - Initiate and maintain comfort rounds  - Make Fall Risk Sign visible to staff    - Apply yellow socks and  bracelet for high fall risk patients  - Consider moving patient to room near nurses station  Outcome: Progressing  Goal: Maintain or return to baseline ADL function  Description: INTERVENTIONS:  -  Assess patient's ability to carry out ADLs; assess patient's baseline for ADL function and identify physical deficits which impact ability to perform ADLs (bathing, care of mouth/teeth, toileting, grooming, dressing, etc.)  - Assess/evaluate cause of self-care deficits   - Assess range of motion  - Assess patient's mobility; develop plan if impaired  - Assess patient's need for assistive devices and provide as appropriate  - Encourage maximum independence but intervene and supervise when necessary  - Involve family in performance of ADLs  - Assess for home care needs following discharge   - Consider OT consult to assist with ADL evaluation and planning for discharge  - Provide patient education as appropriate  Outcome: Progressing  Goal: Maintains/Returns to pre admission functional level  Description: INTERVENTIONS:  - Perform AM-PAC 6 Click Basic Mobility/ Daily Activity assessment daily.  - Set and communicate daily mobility goal to care team and patient/family/caregiver.   - Collaborate with rehabilitation services on mobility goals if consulted    - Out of bed for toileting  - Record patient progress and toleration of activity level   Outcome: Progressing     Problem: DISCHARGE PLANNING  Goal: Discharge to home or other facility with appropriate resources  Description: INTERVENTIONS:  - Identify barriers to discharge w/patient and caregiver  - Arrange for needed discharge resources and transportation as appropriate  - Identify discharge learning needs (meds, wound care, etc.)  - Arrange for interpretive services to assist at discharge as needed  - Refer to Case Management Department for coordinating discharge planning if the patient needs post-hospital services based on physician/advanced practitioner order or  complex needs related to functional status, cognitive ability, or social support system  Outcome: Progressing     Problem: Knowledge Deficit  Goal: Patient/family/caregiver demonstrates understanding of disease process, treatment plan, medications, and discharge instructions  Description: Complete learning assessment and assess knowledge base.  Interventions:  - Provide teaching at level of understanding  - Provide teaching via preferred learning methods  Outcome: Progressing

## 2025-02-23 NOTE — H&P
"H&P - Hospitalist   Name: Nuvia Marina 55 y.o. female I MRN: [id number]  Unit/Bed#: -01 I Date of Admission: 2/22/2025   Date of Service: 2/23/2025 I Hospital Day: 1     Assessment & Plan  Tenosynovitis  Presents to ED due to worsening swelling and pain in left hand since Monday  Was seen by PCP on Wednesday and prescribed p.o. Augmentin, reports to ED today due to worsening of pain/swelling. Denies fever/chills  CT left upper extremity revealing \"Tenosynovitis of the fourth extensor compartment. Subcutaneous edema along the dorsum of the hand suggesting cellulitis.\"  BP elevated since arrival otherwise VSS, labs WNL  Per ER report, hand surgery recommending admission for treatment of infection with a.m. consult  Received 3 g IV Unasyn in ED; will initiate IV ceftriaxone and vancomycin  Diet n.p.o., will defer VTE prophylaxis at this time until decision is made if patient will require surgery in a.m.  Tylenol for pain/fever, oxycodone 2.5mg/5mg for moderate/severe, dilaudid 0.2mg for breakthrough, pain control precautions initiated    Cellulitis  See plan as above  HTN (hypertension)  BP elevated since arrival, patient attributes this to hospital-induced anxiety  Continue PTA antihypertensive regimen which has been confirmed with patient  Hydroxyzine added for anxiety as needed      VTE Pharmacologic Prophylaxis: VTE Score: 3 Moderate Risk (Score 3-4) - Pharmacological DVT Prophylaxis Ordered: will hold off pending possible OR in AM.  Code Status: Level 1 - Full Code   Discussion with family: Patient declined call to .     Anticipated Length of Stay: Patient will be admitted on an inpatient basis with an anticipated length of stay of greater than 2 midnights secondary to IV abx, pending hand surgery evaluation.    History of Present Illness   Chief Complaint:   Chief Complaint   Patient presents with    Hand Swelling     Pt report L hand swelling that started yesterday, denies injury to the " hand, c/o pain from the swelling.      Nuvia Marina is a 55 y.o. female with a PMH of HTN who presents with L hand swelling.  Patient reports developing redness and swelling to dorsum of left hand on Monday.  Patient states they recently got a puppy, admits to possibility of bite from puppy although denies known bite causing a skin break.  Denies any other trauma or injury to hand.  States she was seen by outpatient PCP on Wednesday and prescribed oral Augmentin, denies any missed doses of this.  She presents to ED today per PCP suggestion due to worsening of symptoms.  She denies current or history of fever, chills, chest pain, shortness of breath, nausea, vomiting.  Admits to slightly diminished sensation to dorsum of left hand.    Review of Systems   Constitutional:  Negative for chills and fever.   Respiratory:  Negative for shortness of breath.    Cardiovascular:  Negative for chest pain.   Gastrointestinal:  Negative for abdominal pain, nausea and vomiting.   Musculoskeletal:         + pain and swelling to L hand   Skin:         + Redness to L hand   Neurological:  Negative for headaches.       Historical Information   Past Medical History:   Diagnosis Date    Hypertelorism      History reviewed. No pertinent surgical history.  Social History     Tobacco Use    Smoking status: Never    Smokeless tobacco: Never   Vaping Use    Vaping status: Never Used   Substance and Sexual Activity    Alcohol use: Never    Drug use: Never    Sexual activity: Not on file     E-Cigarette/Vaping    E-Cigarette Use Never User      E-Cigarette/Vaping Substances     History reviewed. No pertinent family history.  Social History:  Marital Status: Single   Occupation:   Patient Pre-hospital Living Situation: Home  Patient Pre-hospital Level of Mobility: walks  Patient Pre-hospital Diet Restrictions:     Meds/Allergies   I have reviewed home medications with patient personally.  Prior to Admission medications    Medication Sig  Start Date End Date Taking? Authorizing Provider   amLODIPine (NORVASC) 5 mg tablet Take 5 mg by mouth daily   Yes Historical Provider, MD   hydroCHLOROthiazide 25 mg tablet Take 25 mg by mouth 3 (three) times a day 11/16/24  Yes Historical Provider, MD   enalapril (VASOTEC) 20 mg tablet Take 20 mg by mouth daily    Historical Provider, MD   losartan (COZAAR) 100 MG tablet  10/22/24 2/23/25  Historical Provider, MD   metoprolol tartrate (LOPRESSOR) 25 mg tablet  10/22/24 2/23/25  Historical Provider, MD     No Known Allergies    Objective :  Temp:  [97.6 °F (36.4 °C)] 97.6 °F (36.4 °C)  HR:  [67-75] 67  BP: (177-222)/() 177/103  Resp:  [18-20] 18  SpO2:  [100 %] 100 %  O2 Device: None (Room air)    Physical Exam  Vitals reviewed.   Constitutional:       General: She is not in acute distress.     Appearance: She is not ill-appearing, toxic-appearing or diaphoretic.   Cardiovascular:      Rate and Rhythm: Normal rate and regular rhythm.   Pulmonary:      Effort: Pulmonary effort is normal. No respiratory distress.      Breath sounds: Normal breath sounds.   Abdominal:      General: Bowel sounds are normal. There is no distension.      Palpations: Abdomen is soft.      Tenderness: There is no abdominal tenderness.   Musculoskeletal:         General: Swelling and tenderness present.      Right hand: Normal capillary refill. Normal pulse.      Left hand: Swelling and tenderness present. Decreased range of motion. Decreased sensation. Normal capillary refill. Normal pulse.      Comments: Diminished sensation to center of dorsum of L hand   Skin:     General: Skin is warm and dry.   Neurological:      Mental Status: She is alert.         Lab Results: I have reviewed the following results:  Results from last 7 days   Lab Units 02/22/25  1636   WBC Thousand/uL 7.69   HEMOGLOBIN g/dL 11.9   HEMATOCRIT % 37.3   PLATELETS Thousands/uL 333   SEGS PCT % 57   LYMPHO PCT % 32   MONO PCT % 8   EOS PCT % 2     Results from last  "7 days   Lab Units 02/22/25  1636   SODIUM mmol/L 139   POTASSIUM mmol/L 3.8   CHLORIDE mmol/L 103   CO2 mmol/L 29   BUN mg/dL 19   CREATININE mg/dL 1.11   ANION GAP mmol/L 7   CALCIUM mg/dL 10.3*   ALBUMIN g/dL 4.4   TOTAL BILIRUBIN mg/dL 0.65   ALK PHOS U/L 106*   ALT U/L 21   AST U/L 23   GLUCOSE RANDOM mg/dL 88             No results found for: \"HGBA1C\"  Results from last 7 days   Lab Units 02/22/25  2246   LACTIC ACID mmol/L 0.9   PROCALCITONIN ng/ml <0.05       Imaging Results Review: I reviewed radiology reports from this admission including: XR L hand and CT Left upper extremity.  Other Study Results Review: No additional pertinent studies reviewed.    Administrative Statements   I have spent a total time of 75 minutes in caring for this patient on the day of the visit/encounter including Diagnostic results, Instructions for management, Patient and family education, Importance of tx compliance, Impressions, Counseling / Coordination of care, Documenting in the medical record, Reviewing/placing orders in the medical record (including tests, medications, and/or procedures), Obtaining or reviewing history  , and Communicating with other healthcare professionals .    ** Please Note: This note has been constructed using a voice recognition system. **    "

## 2025-02-23 NOTE — ASSESSMENT & PLAN NOTE
BP elevated since arrival, patient attributes this to hospital-induced anxiety  Continue PTA antihypertensive regimen which has been confirmed with patient  Hydroxyzine added for anxiety as needed

## 2025-02-24 VITALS
DIASTOLIC BLOOD PRESSURE: 91 MMHG | HEART RATE: 64 BPM | WEIGHT: 216.27 LBS | BODY MASS INDEX: 30.28 KG/M2 | SYSTOLIC BLOOD PRESSURE: 135 MMHG | OXYGEN SATURATION: 99 % | HEIGHT: 71 IN | RESPIRATION RATE: 18 BRPM | TEMPERATURE: 98.5 F

## 2025-02-24 LAB
ANION GAP SERPL CALCULATED.3IONS-SCNC: 7 MMOL/L (ref 4–13)
BASOPHILS # BLD AUTO: 0.06 THOUSANDS/ÂΜL (ref 0–0.1)
BASOPHILS NFR BLD AUTO: 1 % (ref 0–1)
BUN SERPL-MCNC: 19 MG/DL (ref 5–25)
CALCIUM SERPL-MCNC: 10.5 MG/DL (ref 8.4–10.2)
CHLORIDE SERPL-SCNC: 101 MMOL/L (ref 96–108)
CO2 SERPL-SCNC: 31 MMOL/L (ref 21–32)
CREAT SERPL-MCNC: 0.99 MG/DL (ref 0.6–1.3)
EOSINOPHIL # BLD AUTO: 0.11 THOUSAND/ÂΜL (ref 0–0.61)
EOSINOPHIL NFR BLD AUTO: 2 % (ref 0–6)
ERYTHROCYTE [DISTWIDTH] IN BLOOD BY AUTOMATED COUNT: 13.3 % (ref 11.6–15.1)
GFR SERPL CREATININE-BSD FRML MDRD: 64 ML/MIN/1.73SQ M
GLUCOSE SERPL-MCNC: 99 MG/DL (ref 65–140)
HCT VFR BLD AUTO: 38.3 % (ref 34.8–46.1)
HGB BLD-MCNC: 12.2 G/DL (ref 11.5–15.4)
IMM GRANULOCYTES # BLD AUTO: 0 THOUSAND/UL (ref 0–0.2)
IMM GRANULOCYTES NFR BLD AUTO: 0 % (ref 0–2)
LYMPHOCYTES # BLD AUTO: 1.89 THOUSANDS/ÂΜL (ref 0.6–4.47)
LYMPHOCYTES NFR BLD AUTO: 39 % (ref 14–44)
MCH RBC QN AUTO: 25.4 PG (ref 26.8–34.3)
MCHC RBC AUTO-ENTMCNC: 31.9 G/DL (ref 31.4–37.4)
MCV RBC AUTO: 80 FL (ref 82–98)
MONOCYTES # BLD AUTO: 0.38 THOUSAND/ÂΜL (ref 0.17–1.22)
MONOCYTES NFR BLD AUTO: 8 % (ref 4–12)
NEUTROPHILS # BLD AUTO: 2.38 THOUSANDS/ÂΜL (ref 1.85–7.62)
NEUTS SEG NFR BLD AUTO: 50 % (ref 43–75)
NRBC BLD AUTO-RTO: 0 /100 WBCS
PLATELET # BLD AUTO: 370 THOUSANDS/UL (ref 149–390)
PMV BLD AUTO: 9.9 FL (ref 8.9–12.7)
POTASSIUM SERPL-SCNC: 3.8 MMOL/L (ref 3.5–5.3)
RBC # BLD AUTO: 4.81 MILLION/UL (ref 3.81–5.12)
SODIUM SERPL-SCNC: 139 MMOL/L (ref 135–147)
VANCOMYCIN SERPL-MCNC: 8.6 UG/ML (ref 10–20)
WBC # BLD AUTO: 4.82 THOUSAND/UL (ref 4.31–10.16)

## 2025-02-24 PROCEDURE — 99239 HOSP IP/OBS DSCHRG MGMT >30: CPT | Performed by: INTERNAL MEDICINE

## 2025-02-24 PROCEDURE — 85025 COMPLETE CBC W/AUTO DIFF WBC: CPT | Performed by: INTERNAL MEDICINE

## 2025-02-24 PROCEDURE — 80202 ASSAY OF VANCOMYCIN: CPT

## 2025-02-24 PROCEDURE — 80048 BASIC METABOLIC PNL TOTAL CA: CPT | Performed by: INTERNAL MEDICINE

## 2025-02-24 RX ORDER — SULFAMETHOXAZOLE AND TRIMETHOPRIM 800; 160 MG/1; MG/1
1 TABLET ORAL EVERY 12 HOURS SCHEDULED
Qty: 20 TABLET | Refills: 0 | Status: SHIPPED | OUTPATIENT
Start: 2025-02-24 | End: 2025-03-06

## 2025-02-24 RX ORDER — CEFPODOXIME PROXETIL 200 MG/1
200 TABLET, FILM COATED ORAL 2 TIMES DAILY
Qty: 20 TABLET | Refills: 0 | Status: SHIPPED | OUTPATIENT
Start: 2025-02-24 | End: 2025-03-06

## 2025-02-24 RX ADMIN — AMLODIPINE BESYLATE 5 MG: 5 TABLET ORAL at 08:28

## 2025-02-24 RX ADMIN — CEFTRIAXONE SODIUM 1000 MG: 10 INJECTION, POWDER, FOR SOLUTION INTRAVENOUS at 08:28

## 2025-02-24 RX ADMIN — ACETAMINOPHEN 650 MG: 325 TABLET, FILM COATED ORAL at 04:59

## 2025-02-24 RX ADMIN — HYDROCHLOROTHIAZIDE 25 MG: 25 TABLET ORAL at 08:28

## 2025-02-24 RX ADMIN — VANCOMYCIN HYDROCHLORIDE 750 MG: 750 INJECTION, SOLUTION INTRAVENOUS at 02:45

## 2025-02-24 RX ADMIN — LISINOPRIL 40 MG: 20 TABLET ORAL at 08:28

## 2025-02-24 RX ADMIN — VANCOMYCIN HYDROCHLORIDE 1250 MG: 5 INJECTION, POWDER, LYOPHILIZED, FOR SOLUTION INTRAVENOUS at 09:13

## 2025-02-24 NOTE — PROGRESS NOTES
Nuvia Marina is a 55 y.o. female who is currently ordered Vancomycin IV with management by the Pharmacy Consult service.  Relevant clinical data and objective / subjective history reviewed.  Vancomycin Assessment:  Indication and Goal AUC/Trough: Soft tissue (goal -600, trough >10), -600, trough >10  Clinical Status: stable  Micro: N/A  Renal Function:  SCr: 0.99 mg/dL  CrCl: 73 mL/min  Renal replacement: Not on dialysis  Days of Therapy: 2  Current Dose: 750mg IV q12h  Vancomycin Plan:  New Dosing: increase to 1250mg IV q12h  Estimated AUC: 439 mcg*hr/mL  Estimated Trough: 12.3 mcg/mL  Next Level: 2/25 AM draw  Renal Function Monitoring: Daily BMP and UOP  Pharmacy will continue to follow closely for s/sx of nephrotoxicity, infusion reactions and appropriateness of therapy.  BMP and CBC will be ordered per protocol. We will continue to follow the patient’s culture results and clinical progress daily.    Elvira Roach, Pharmacist

## 2025-02-24 NOTE — UTILIZATION REVIEW
NOTIFICATION OF ADMISSION DISCHARGE   This is a Notification of Discharge from [facility]. Please be advised that this patient has been discharge from our facility. Below you will find the admission and discharge date and time including the patient’s disposition.   UTILIZATION REVIEW CONTACT:  Melissa Gunter  Utilization   Network Utilization Review Department  Phone: [phone] x carefully listen to the prompts. All voicemails are confidential.  Email: [email or web address]     ADMISSION INFORMATION  PRESENTATION DATE: 2/22/2025  3:41 PM  OBERVATION ADMISSION DATE: N/A  INPATIENT ADMISSION DATE: 2/22/25 11:02 PM   DISCHARGE DATE: 2/24/2025 11:57 AM   DISPOSITION:Home/Self Care    Network Utilization Review Department  ATTENTION: Please call with any questions or concerns to [phone] and carefully listen to the prompts so that you are directed to the right person. All voicemails are confidential.   For Discharge needs, contact Care Management DC Support Team at [phone] opt. 2  Send all requests for admission clinical reviews, approved or denied determinations and any other requests to dedicated fax number below belonging to the campus where the patient is receiving treatment. List of dedicated fax numbers for the Facilities:  FACILITY NAME UR FAX NUMBER   ADMISSION DENIALS (Administrative/Medical Necessity) [phone]   DISCHARGE SUPPORT TEAM ([facility]) [phone]   PARENT CHILD HEALTH (Maternity/NICU/Pediatrics) [phone]   [facility] [phone]   [facility] [phone]   [facility] [phone]   [facility] [phone]   [facility] [phone]   [facility] [phone]   [facility] [phone]   [facility]  [phone]   [facility] [phone]   On license of [facility] [phone]   [facility] [phone]   [facility] [phone]

## 2025-02-24 NOTE — UTILIZATION REVIEW
NOTIFICATION OF INPATIENT ADMISSION   AUTHORIZATION REQUEST   SERVICING FACILITY:   [place] [address]  Tax ID: [id number]  NPI: [id number] ATTENDING PROVIDER:  Attending Name and NPI#: Michael Payan Md [[id number]]  Address: [address] [place] [address]  Phone: [phone]     ADMISSION INFORMATION:  Place of Service: Inpatient [facility]  Place of Service Code: 21  Inpatient Admission Date/Time: 2/22/25 11:02 PM  Discharge Date/Time: No discharge date for patient encounter.  Admitting Diagnosis Code/Description:  Cellulitis [L03.90]  Hand swelling [M79.89]     UTILIZATION REVIEW CONTACT:  Rayne Davila Utilization   Network Utilization Review Department  Phone: [phone]  Fax [phone]  Email: [email or web address]  Contact for approvals/pending authorizations, clinical reviews, and discharge.     PHYSICIAN ADVISORY SERVICES:  Medical Necessity Denial & Kyoh-eu-Vzri Review  Phone: [phone]  Fax: [phone]  Email: [email or web address]     DISCHARGE SUPPORT TEAM:  For Patients Discharge Needs & Updates  Phone: [phone] opt. 2 Fax: [phone]  Email: [email or web address]

## 2025-02-24 NOTE — ASSESSMENT & PLAN NOTE
" presents to ED due to worsening swelling and pain in left hand since Monday  Was seen by PCP on Wednesday and prescribed p.o. Augmentin, reports to ED today due to worsening of pain/swelling. Denies fever/chills  CT left upper extremity revealing \"Tenosynovitis of the fourth extensor compartment. Subcutaneous edema along the dorsum of the hand suggesting cellulitis.\"  Clinically improved with ceftriaxone and vancomycin  Will transition to Vantin and Bactrim to complete 10-day course    "

## 2025-02-24 NOTE — UTILIZATION REVIEW
Initial Clinical Review    Admission: Date/Time/Statement:   Admission Orders (From admission, onward)       Ordered        02/22/25 2302  INPATIENT ADMISSION  Once                          Orders Placed This Encounter   Procedures    INPATIENT ADMISSION     Standing Status:   Standing     Number of Occurrences:   1     Level of Care:   Med Surg [16]     Estimated length of stay:   More than 2 Midnights     Certification:   I certify that inpatient services are medically necessary for this patient for a duration of greater than two midnights. See H&P and MD Progress Notes for additional information about the patient's course of treatment.     ED Arrival Information       Expected   -    Arrival   2/22/2025 14:59    Acuity   Urgent              Means of arrival   Walk-In    Escorted by   [place]    Service   Hospitalist    Admission type   Emergency              Arrival complaint   Left Hand Swelling             Chief Complaint   Patient presents with    Hand Swelling     Pt report L hand swelling that started yesterday, denies injury to the hand, c/o pain from the swelling.       Initial Presentation: 55 y.o. female to the ED from home with complaints of left hand swelling. Admitted to inpatient for tenosynovitis, cellulitis, htn.  H/O htn.  Arrives with redness to left hand with swelling, pain.  Recently got a new puppy, may have been bit by it. Has diminished sensation to left hand. Ortho consult.  In the Ed, given IV toradol, started no IV abx.         Ortho:  Left hand extensor tenosynovitis with underlying cellulitis . Continue with IV abx, Aqua K pad to area.  Work on Range of motion. Analgesics as needed.   Anticipated Length of Stay/Certification Statement: Patient will be admitted on an inpatient basis with an anticipated length of stay of greater than 2 midnights secondary to IV abx, pending hand surgery evaluation.       Date: 2/23   Day 2:    Continue with IV abx, pain conrolled.  BP has improved.   Swelling improved since starting iV abx.     ED Treatment-Medication Administration from 02/22/2025 1459 to 02/22/2025 2330         Date/Time Order Dose Route Action     02/22/2025 1707 ketorolac (TORADOL) injection 15 mg 15 mg Intravenous Given     02/22/2025 2247 ampicillin-sulbactam (UNASYN) 3 g in sodium chloride 0.9 % 100 mL IVPB 3 g Intravenous New Bag            Scheduled Medications:  acetaminophen, 650 mg, Oral, Q6H MENDY  amLODIPine, 5 mg, Oral, Daily  cefTRIAXone, 1,000 mg, Intravenous, Q24H  hydroCHLOROthiazide, 25 mg, Oral, TID  lisinopril, 40 mg, Oral, Daily  vancomycin, 1,250 mg, Intravenous, Q12H      Continuous IV Infusions:     PRN Meds:  HYDROmorphone, 0.2 mg, Intravenous, Q6H PRN  hydrOXYzine HCL, 25 mg, Oral, Q6H PRN  oxyCODONE, 5 mg, Oral, Q6H PRN  oxyCODONE, 2.5 mg, Oral, Q6H PRN      ED Triage Vitals   Temperature Pulse Respirations Blood Pressure SpO2 Pain Score   02/22/25 1505 02/22/25 1505 02/22/25 1505 02/22/25 1505 02/22/25 1505 02/22/25 1707   97.6 °F (36.4 °C) 75 20 (!) 183/97 100 % 7     Weight (last 2 days)       Date/Time Weight    02/24/25 0723 98.1 (216.27)    02/22/25 1505 98.1 (216.27)            Vital Signs (last 3 days)       Date/Time Temp Pulse Resp BP MAP (mmHg) SpO2 O2 Device Patient Position - Orthostatic VS Pain    02/23/25 2256 -- -- -- -- -- 99 % None (Room air) -- No Pain    02/23/25 2100 98.5 °F (36.9 °C) 64 -- 135/84 101 99 % -- -- --    02/23/25 1817 -- -- -- -- -- -- -- -- 3    02/23/25 1236 -- -- -- -- -- -- -- -- 3    02/23/25 0930 -- -- -- -- -- -- -- -- 3    02/23/25 06:53:59 -- 64 -- 154/96 115 97 % None (Room air) -- --    02/23/25 01:02:34 97.9 °F (36.6 °C) 62 18 182/100 -- 97 % -- Lying 3    02/23/25 0005 -- -- -- -- -- -- -- -- 3    02/22/25 2300 -- -- -- -- -- 100 % None (Room air) -- 3    02/22/25 1743 -- -- -- -- -- -- -- -- 5 02/22/25 1707 -- -- -- -- -- -- -- -- 7 02/22/25 1700 -- -- -- 177/103 133 -- -- -- --    02/22/25 1633 -- -- -- 198/109 --  -- -- -- --    02/22/25 1630 -- -- -- 222/118 159 -- -- -- --    02/22/25 1600 -- 67 18 197/103 142 100 % -- -- --    02/22/25 1547 -- 68 -- 183/109 140 100 % None (Room air) Sitting --    02/22/25 1505 97.6 °F (36.4 °C) 75 20 183/97 133 100 % None (Room air) Sitting --              Pertinent Labs/Diagnostic Test Results:   Radiology:  CT upper extremity w contrast left   Final Interpretation by Polo Vora DO (02/22 2059)      Tenosynovitis of the fourth extensor compartment.      Subcutaneous edema along the dorsum of the hand suggesting cellulitis.      The study was marked in EPIC for immediate notification.      Workstation performed: [employer]         XR hand 3+ views LEFT   Final Interpretation by Percy Wood MD (02/23 1117)      No acute osseous abnormality.         Computerized Assisted Algorithm (CAA) may have been used to analyze all applicable images.         Workstation performed: [id number]           Cardiology:        Results from last 7 days   Lab Units 02/23/25  0539 02/22/25  1636   WBC Thousand/uL 6.55 7.69   HEMOGLOBIN g/dL 12.4 11.9   HEMATOCRIT % 38.4 37.3   PLATELETS Thousands/uL 365 333   TOTAL NEUT ABS Thousands/µL  --  4.44         Results from last 7 days   Lab Units 02/23/25  0539 02/22/25  1636   SODIUM mmol/L 140 139   POTASSIUM mmol/L 3.8 3.8   CHLORIDE mmol/L 103 103   CO2 mmol/L 29 29   ANION GAP mmol/L 8 7   BUN mg/dL 18 19   CREATININE mg/dL 1.09 1.11   EGFR ml/min/1.73sq m 57 56   CALCIUM mg/dL 10.6* 10.3*     Results from last 7 days   Lab Units 02/22/25  1636   AST U/L 23   ALT U/L 21   ALK PHOS U/L 106*   TOTAL PROTEIN g/dL 7.8   ALBUMIN g/dL 4.4   TOTAL BILIRUBIN mg/dL 0.65         Results from last 7 days   Lab Units 02/23/25  0539 02/22/25  1636   GLUCOSE RANDOM mg/dL 101 88           Results from last 7 days   Lab Units 02/22/25  2246   PROCALCITONIN ng/ml <0.05     Results from last 7 days   Lab Units 02/22/25  2246   LACTIC ACID mmol/L 0.9         Past Medical History:    Diagnosis Date    Hypertelorism      Present on Admission:   HTN (hypertension)      Admitting Diagnosis: Cellulitis [L03.90]  Hand swelling [M79.89]  Age/Sex: 55 y.o. female    Network Utilization Review Department  ATTENTION: Please call with any questions or concerns to [phone] and carefully listen to the prompts so that you are directed to the right person. All voicemails are confidential.   For Discharge needs, contact Care Management DC Support Team at [phone] opt. [phone]  Send all requests for admission clinical reviews, approved or denied determinations and any other requests to dedicated fax number below belonging to the campus where the patient is receiving treatment. List of dedicated fax numbers for the Facilities:  FACILITY NAME UR FAX NUMBER   ADMISSION DENIALS (Administrative/Medical Necessity) [phone]   DISCHARGE SUPPORT TEAM (NETWORK) [phone]   PARENT CHILD HEALTH (Maternity/NICU/Pediatrics) [phone]   [facility] [phone]   [facility] [phone]   [facility] [phone]   [facility] [phone]   [facility] [phone]   [facility] [phone]   [facility] [phone]   [facility] [phone]   [facility] [phone]   [facility] [phone]   [facility] [phone]   [facility] [phone]

## 2025-02-24 NOTE — PLAN OF CARE
Problem: PAIN - ADULT  Goal: Verbalizes/displays adequate comfort level or baseline comfort level  Description: Interventions:  - Encourage patient to monitor pain and request assistance  - Assess pain using appropriate pain scale  - Administer analgesics based on type and severity of pain and evaluate response  - Implement non-pharmacological measures as appropriate and evaluate response  - Consider cultural and social influences on pain and pain management  - Notify physician/advanced practitioner if interventions unsuccessful or patient reports new pain  Outcome: Progressing     Problem: INFECTION - ADULT  Goal: Absence or prevention of progression during hospitalization  Description: INTERVENTIONS:  - Assess and monitor for signs and symptoms of infection  - Monitor lab/diagnostic results  - Monitor all insertion sites, i.e. indwelling lines, tubes, and drains  - Monitor endotracheal if appropriate and nasal secretions for changes in amount and color  - [place] appropriate cooling/warming therapies per order  - Administer medications as ordered  - Instruct and encourage patient and family to use good hand hygiene technique  - Identify and instruct in appropriate isolation precautions for identified infection/condition  Outcome: Progressing  Goal: Absence of fever/infection during neutropenic period  Description: INTERVENTIONS:  - Monitor WBC    Outcome: Progressing     Problem: SAFETY ADULT  Goal: Patient will remain free of falls  Description: INTERVENTIONS:  - Educate patient/family on patient safety including physical limitations  - Instruct patient to call for assistance with activity   - Consult OT/PT to assist with strengthening/mobility   - Keep Call bell within reach  - Keep bed low and locked with side rails adjusted as appropriate  - Keep care items and personal belongings within reach  - Initiate and maintain comfort rounds  - Make Fall Risk Sign visible to staff    - Apply yellow socks and  bracelet for high fall risk patients  - Consider moving patient to room near nurses station  Outcome: Progressing  Goal: Maintain or return to baseline ADL function  Description: INTERVENTIONS:  -  Assess patient's ability to carry out ADLs; assess patient's baseline for ADL function and identify physical deficits which impact ability to perform ADLs (bathing, care of mouth/teeth, toileting, grooming, dressing, etc.)  - Assess/evaluate cause of self-care deficits   - Assess range of motion  - Assess patient's mobility; develop plan if impaired  - Assess patient's need for assistive devices and provide as appropriate  - Encourage maximum independence but intervene and supervise when necessary  - Involve family in performance of ADLs  - Assess for home care needs following discharge   - Consider OT consult to assist with ADL evaluation and planning for discharge  - Provide patient education as appropriate  Outcome: Progressing  Goal: Maintains/Returns to pre admission functional level  Description: INTERVENTIONS:  - Perform AM-PAC 6 Click Basic Mobility/ Daily Activity assessment daily.  - Set and communicate daily mobility goal to care team and patient/family/caregiver.   - Collaborate with rehabilitation services on mobility goals if consulted    - Out of bed for toileting  - Record patient progress and toleration of activity level   Outcome: Progressing     Problem: DISCHARGE PLANNING  Goal: Discharge to home or other facility with appropriate resources  Description: INTERVENTIONS:  - Identify barriers to discharge w/patient and caregiver  - Arrange for needed discharge resources and transportation as appropriate  - Identify discharge learning needs (meds, wound care, etc.)  - Arrange for interpretive services to assist at discharge as needed  - Refer to Case Management Department for coordinating discharge planning if the patient needs post-hospital services based on physician/advanced practitioner order or  complex needs related to functional status, cognitive ability, or social support system  Outcome: Progressing     Problem: Knowledge Deficit  Goal: Patient/family/caregiver demonstrates understanding of disease process, treatment plan, medications, and discharge instructions  Description: Complete learning assessment and assess knowledge base.  Interventions:  - Provide teaching at level of understanding  - Provide teaching via preferred learning methods  Outcome: Progressing

## 2025-02-24 NOTE — DISCHARGE SUMMARY
"Discharge Summary - Hospitalist   Name: Nuvia Marina 55 y.o. female I MRN: [id number]  Unit/Bed#: -01 I Date of Admission: 2/22/2025   Date of Service: 2/24/2025 I Hospital Day: 2     Assessment & Plan  [place]   presents to ED due to worsening swelling and pain in left hand since Monday  Was seen by PCP on Wednesday and prescribed p.o. Augmentin, reports to ED today due to worsening of pain/swelling. Denies fever/chills  CT left upper extremity revealing \"Tenosynovitis of the fourth extensor compartment. Subcutaneous edema along the dorsum of the hand suggesting cellulitis.\"  Clinically improved with ceftriaxone and vancomycin  Will transition to Vantin and Bactrim to complete 10-day course    Cellulitis  See plan as above  HTN (hypertension)  BP elevated since arrival, patient attributes this to hospital-induced anxiety  Continue PTA antihypertensive regimen which has been confirmed with patient  Hydroxyzine added for anxiety as needed     Discharging Physician / Practitioner: Michael Payan MD  PCP: No primary care provider on file.  Admission Date:   Admission Orders (From admission, onward)       Ordered        02/22/25 2302  INPATIENT ADMISSION  Once                          Discharge Date: 02/24/25    Medical Problems       Resolved Problems  Date Reviewed: 2/24/2025   None         Consultations During Hospital Stay:  ortho    Procedures Performed:   none    Significant Findings / Test Results:   XR hand 3+ views LEFT  Result Date: 2/23/2025  Impression: No acute osseous abnormality. Computerized Assisted Algorithm (CAA) may have been used to analyze all applicable images. Workstation performed: [id number]     CT upper extremity w contrast left  Result Date: 2/22/2025  Impression: Tenosynovitis of the fourth extensor compartment. Subcutaneous edema along the dorsum of the hand suggesting cellulitis. The study was marked in EPIC for immediate notification. Workstation performed: [id number]  " "    Incidental Findings:   none     Test Results Pending at Discharge (will require follow up):   none     Outpatient Tests Requested:  none    Complications:  none    Reason for Admission:    Tenosynovitis    Hospital Course:     Nvuia Marina is a 55 y.o. female patient who originally presented to the hospital on 2/22/2025 with past medical history significant hypertension initially presented with right hand swelling significant of tenosynovitis/cellulitis was treated with IV antibiotics with significant improvement evaluated by Ortho recommended continued conservative therapy and IV antibiotics will be transitioned to oral antibiotics complete 10-day course.  Will follow-up outpatient with primary care doctor      Please see above list of diagnoses and related plan for additional information.     Condition at Discharge: stable     Discharge Day Visit / Exam:     Subjective:   Reports pain and swelling significantly improved.  Denies fevers, chills, cough or any other complaints  Vitals: Blood Pressure: 135/91 (02/24/25 0828)  Pulse: 64 (02/23/25 2100)  Temperature: 98.5 °F (36.9 °C) (02/23/25 2100)  Temp Source: Oral (02/23/25 0102)  Respirations: 18 (02/23/25 0102)  Height: 5' 11\" (180.3 cm) (02/24/25 0723)  Weight - Scale: 98.1 kg (216 lb 4.3 oz) (02/24/25 0723)  SpO2: 99 % (02/23/25 2256)  Exam:   Physical Exam  Constitutional:       General: She is not in acute distress.     Appearance: She is well-developed. She is not diaphoretic.   HENT:      Head: Normocephalic and atraumatic.      Nose: Nose normal.      Mouth/Throat:      Pharynx: No oropharyngeal exudate.   Eyes:      General: No scleral icterus.        Right eye: No discharge.         Left eye: No discharge.      Conjunctiva/sclera: Conjunctivae normal.   Neck:      Thyroid: No thyromegaly.      Vascular: No JVD.   Cardiovascular:      Rate and Rhythm: Normal rate and regular rhythm.      Heart sounds: Normal heart sounds. No murmur heard.     No " friction rub. No gallop.   Pulmonary:      Effort: Pulmonary effort is normal. No respiratory distress.      Breath sounds: Normal breath sounds. No wheezing or rales.   Chest:      Chest wall: No tenderness.   Abdominal:      General: Bowel sounds are normal. There is no distension.      Palpations: Abdomen is soft.      Tenderness: There is no abdominal tenderness. There is no guarding or rebound.   Musculoskeletal:         General: No tenderness or deformity. Normal range of motion.      Cervical back: Normal range of motion and neck supple.   Skin:     General: Skin is warm and dry.      Findings: No erythema or rash.   Neurological:      Mental Status: She is alert. Mental status is at baseline.      Cranial Nerves: No cranial nerve deficit.      Sensory: No sensory deficit.      Motor: No abnormal muscle tone.      Coordination: Coordination normal.       (  Discussion with Family: pt, admitted where    Discharge instructions/Information to patient and family:   See after visit summary for information provided to patient and family.      Provisions for Follow-Up Care:  See after visit summary for information related to follow-up care and any pertinent home health orders.      Disposition:     Home    For Discharges to [facility]:   Not Applicable to this Patient - Not Applicable to this Patient    Planned Readmission: none     Discharge Statement:  I spent 60 minutes discharging the patient. This time was spent on the day of discharge. I had direct contact with the patient on the day of discharge. Greater than 50% of the total time was spent examining patient, answering all patient questions, arranging and discussing plan of care with patient as well as directly providing post-discharge instructions.  Additional time then spent on discharge activities.    Discharge Medications:  See after visit summary for reconciled discharge medications provided to patient and family.      ** Please Note: This  note has been constructed using a voice recognition system **

## 2025-02-24 NOTE — PLAN OF CARE
Problem: PAIN - ADULT  Goal: Verbalizes/displays adequate comfort level or baseline comfort level  Description: Interventions:  - Encourage patient to monitor pain and request assistance  - Assess pain using appropriate pain scale  - Administer analgesics based on type and severity of pain and evaluate response  - Implement non-pharmacological measures as appropriate and evaluate response  - Consider cultural and social influences on pain and pain management  - Notify physician/advanced practitioner if interventions unsuccessful or patient reports new pain  Outcome: Progressing     Problem: INFECTION - ADULT  Goal: Absence or prevention of progression during hospitalization  Description: INTERVENTIONS:  - Assess and monitor for signs and symptoms of infection  - Monitor lab/diagnostic results  - Monitor all insertion sites, i.e. indwelling lines, tubes, and drains  - Monitor endotracheal if appropriate and nasal secretions for changes in amount and color  - [place] appropriate cooling/warming therapies per order  - Administer medications as ordered  - Instruct and encourage patient and family to use good hand hygiene technique  - Identify and instruct in appropriate isolation precautions for identified infection/condition  Outcome: Progressing  Goal: Absence of fever/infection during neutropenic period  Description: INTERVENTIONS:  - Monitor WBC    Outcome: Progressing     Problem: SAFETY ADULT  Goal: Patient will remain free of falls  Description: INTERVENTIONS:  - Educate patient/family on patient safety including physical limitations  - Instruct patient to call for assistance with activity   - Consult OT/PT to assist with strengthening/mobility   - Keep Call bell within reach  - Keep bed low and locked with side rails adjusted as appropriate  - Keep care items and personal belongings within reach  - Initiate and maintain comfort rounds  - Make Fall Risk Sign visible to staff  - Offer Toileting every 2 Hours,  in advance of need  - Initiate/Maintain bed alarm  - Obtain necessary fall risk management equipment  - Apply yellow socks and bracelet for high fall risk patients  - Consider moving patient to room near nurses station  Outcome: Progressing  Goal: Maintain or return to baseline ADL function  Description: INTERVENTIONS:  -  Assess patient's ability to carry out ADLs; assess patient's baseline for ADL function and identify physical deficits which impact ability to perform ADLs (bathing, care of mouth/teeth, toileting, grooming, dressing, etc.)  - Assess/evaluate cause of self-care deficits   - Assess range of motion  - Assess patient's mobility; develop plan if impaired  - Assess patient's need for assistive devices and provide as appropriate  - Encourage maximum independence but intervene and supervise when necessary  - Involve family in performance of ADLs  - Assess for home care needs following discharge   - Consider OT consult to assist with ADL evaluation and planning for discharge  - Provide patient education as appropriate  Outcome: Progressing  Goal: Maintains/Returns to pre admission functional level  Description: INTERVENTIONS:  - Perform AM-PAC 6 Click Basic Mobility/ Daily Activity assessment daily.  - Set and communicate daily mobility goal to care team and patient/family/caregiver.   - Collaborate with rehabilitation services on mobility goals if consulted  - Perform Range of Motion 3 times a day.  - Reposition patient every 3 hours.  - Dangle patient 3 times a day  - Stand patient 3 times a day  - Ambulate patient 3 times a day  - Out of bed to chair 3 times a day   - Out of bed for meals 3 times a day  - Out of bed for toileting  - Record patient progress and toleration of activity level   Outcome: Progressing     Problem: DISCHARGE PLANNING  Goal: Discharge to home or other facility with appropriate resources  Description: INTERVENTIONS:  - Identify barriers to discharge w/patient and caregiver  -  Arrange for needed discharge resources and transportation as appropriate  - Identify discharge learning needs (meds, wound care, etc.)  - Arrange for interpretive services to assist at discharge as needed  - Refer to Case Management Department for coordinating discharge planning if the patient needs post-hospital services based on physician/advanced practitioner order or complex needs related to functional status, cognitive ability, or social support system  Outcome: Progressing     Problem: Knowledge Deficit  Goal: Patient/family/caregiver demonstrates understanding of disease process, treatment plan, medications, and discharge instructions  Description: Complete learning assessment and assess knowledge base.  Interventions:  - Provide teaching at level of understanding  - Provide teaching via preferred learning methods  Outcome: Progressing

## 2025-02-25 ENCOUNTER — TELEPHONE (OUTPATIENT)
Age: 55
End: 2025-02-25

## 2025-03-03 ENCOUNTER — OFFICE VISIT (OUTPATIENT)
Dept: OBGYN CLINIC | Facility: CLINIC | Age: 55
End: 2025-03-03
Payer: COMMERCIAL

## 2025-03-03 DIAGNOSIS — M65.932 EXTENSOR TENOSYNOVITIS OF LEFT WRIST: Primary | ICD-10-CM

## 2025-03-03 PROCEDURE — 99214 OFFICE O/P EST MOD 30 MIN: CPT | Performed by: STUDENT IN AN ORGANIZED HEALTH CARE EDUCATION/TRAINING PROGRAM

## 2025-03-03 NOTE — PROGRESS NOTES
ORTHOPAEDIC HAND, WRIST, AND ELBOW OFFICE  VISIT     Name: Nuvia Marina      : 1970      MRN: [id number]  Encounter Provider: Jose Naidu MD  Encounter Date: 3/3/2025   Encounter department: [facility]  :  Assessment & Plan  Extensor tenosynovitis of left wrist  Augmentin sent to patients pharmacy, recommend complete course as prescribed.  Use compression as needed, may take OTC antiinflammatories   Activities as tolerated  Orders:    amoxicillin-clavulanate (AUGMENTIN) 875-125 mg per tablet; Take 1 tablet by mouth every 12 (twelve) hours for 7 days             ASSESSMENT/PLAN:    Nuvia Marina is a 55 y.o. RHD female who presents with left wrist extensor tenosynovitis    Etiology and treatment options discussed, all her questions were addressed.  Patient prescribed Augmentin for 7 days, complete antibiotic course as prescribed.  May use compression wrap as needed for pain and swelling.     Follow Up:  If symptoms worsen or fail to improve       ____________________________________________________________________________________________________________________________________________      CHIEF COMPLAINT:  Left hand pain and swelling     SUBJECTIVE:  Nuvia Marina is a 55 y.o. female who presents with pain and swelling to the left wrist. Due to significant swelling patient was seen at ED 25. She was treated with IV antibiotics and notes improvements in her pain and swelling. She has difficulty with finger extension. She denies any injury.   Radiation: Yes to the  wrist  Previous Treatments: activity modification, ice, and IV antibiotics with only partial relief  Associated symptoms: Stiffness/LROM  Handedness: right  Work status:     I have personally reviewed all the relevant PMH, PSH, SH, FH, Medications and allergies      PAST MEDICAL HISTORY:  Past Medical History:   Diagnosis Date    Hypertelorism        PAST SURGICAL HISTORY:  History  "reviewed. No pertinent surgical history.    FAMILY HISTORY:  History reviewed. No pertinent family history.    SOCIAL HISTORY:  Social History     Tobacco Use    Smoking status: Never    Smokeless tobacco: Never   Vaping Use    Vaping status: Never Used   Substance Use Topics    Alcohol use: Never    Drug use: Never       MEDICATIONS:    Current Outpatient Medications:     amLODIPine (NORVASC) 5 mg tablet, Take 5 mg by mouth daily, Disp: , Rfl:     amoxicillin-clavulanate (AUGMENTIN) 875-125 mg per tablet, Take 1 tablet by mouth every 12 (twelve) hours for 7 days, Disp: 14 tablet, Rfl: 0    cefpodoxime (VANTIN) 200 mg tablet, Take 1 tablet (200 mg total) by mouth 2 (two) times a day for 10 days, Disp: 20 tablet, Rfl: 0    enalapril (VASOTEC) 20 mg tablet, Take 20 mg by mouth daily, Disp: , Rfl:     hydroCHLOROthiazide 25 mg tablet, Take 25 mg by mouth 3 (three) times a day, Disp: , Rfl:     sulfamethoxazole-trimethoprim (BACTRIM DS) 800-160 mg per tablet, Take 1 tablet by mouth every 12 (twelve) hours for 10 days (Patient not taking: Reported on 3/3/2025), Disp: 20 tablet, Rfl: 0    ALLERGIES:  Allergies   Allergen Reactions    Bactrim [Sulfamethoxazole-Trimethoprim] Rash         REVIEW OF SYSTEMS:  Pertinent items are noted in HPI.  A comprehensive review of systems was negative.    VITALS:  There were no vitals filed for this visit.    LABS:  HgA1c: No results found for: \"HGBA1C\"  BMP:   Lab Results   Component Value Date    CALCIUM 10.5 (H) 02/24/2025    K 3.8 02/24/2025    CO2 31 02/24/2025     02/24/2025    BUN 19 02/24/2025    CREATININE 0.99 02/24/2025       _____________________________________________________  PHYSICAL EXAMINATION:  General: well developed and well nourished, alert, oriented times 3, and appears comfortable  Psychiatric: Normal  HEENT: Normocephalic, Atraumatic Trachea Midline, No torticollis  Pulmonary: No audible wheezing or respiratory distress   Abdomen/GI: Non tender, non " "distended   Cardiovascular: Regular Rate and Rhythm. No pitting edema, 2+ radial pulse   Skin: No masses, erythema, lacerations, fluctation, ulcerations  Neurovascular: Sensation Intact to the Median, Ulnar, Radial Nerve, Motor Intact to the Median, Ulnar, Radial Nerve, and Pulses Intact  Musculoskeletal: Normal, except as noted in detailed exam and in HPI.        FOCUSED MUSCULOSKELETAL EXAMINATION:    Left Upper Extremity  Inspection: skin intact, no notable deformity   Palpation: Small area of swelling dorsally over extensor tendons, TTP   Neurologic: 5/5 elbow flexion, 5/5 elbow extension, 5/5 wrist extension, 5/5 wrist flexion, 5/5 finger flexion, 5/5 finger extension, 5/5 FPL, 5/5 EPL, 5/5 APB, 5/5 intrinsics, sensation intact to median, radial, and ulnar nerve distributions  Vascular: Palpable radial pulse, brisk cap refill <2sec, hand warm and well perfused  MSK:     Full FDS, FDP, extensor mechanisms are intact  No rotational deformity with composite finger flexion  ___________________________________________________  STUDIES REVIEWED:  Xrays of the left hand were obtained on 2/22/25 were independently reviewed which demonstrates no acute osseous abnormalities    LABS REVIEWED:    HgA1c: No results found for: \"HGBA1C\"  BMP:   Lab Results   Component Value Date    CALCIUM 10.5 (H) 02/24/2025    K 3.8 02/24/2025    CO2 31 02/24/2025     02/24/2025    BUN 19 02/24/2025    CREATININE 0.99 02/24/2025               PROCEDURES PERFORMED:  Procedures  No Procedures performed today    _____________________________________________________      Scribe Attestation      I,:  Sunitavanna Regalado am acting as a scribe while in the presence of the attending physician.:       I,:  Jose Naidu MD personally performed the services described in this documentation    as scribed in my presence.:               I agree with the history, physical examination, assessment and plan of care as documented " above.    Jose Naidu M.D.  Attending, Orthopaedic Surgery  Hand, Wrist, and Elbow Surgery  [facility]

## 2025-03-10 NOTE — ED PROVIDER NOTES
Time reflects when diagnosis was documented in both MDM as applicable and the Disposition within this note       Time User Action Codes Description Comment    2/22/2025 10:37 PM Jaspreet Murrayyssa ASHLY Add [M79.89] Hand swelling     2/22/2025 10:38 PM Brooke Murraysa ASHLY Add [L03.90] Cellulitis     2/23/2025 12:00 AM Octavia Ruby Add [M65.949] Flexor tenosynovitis of finger     2/23/2025 12:01 AM Ruby Dudley Add [M65.90] Tenosynovitis           ED Disposition       ED Disposition   Admit    Condition   Stable    Date/Time   Sat Feb 22, 2025 10:40 PM    Comment   Case was discussed with hospitalist and the patient's admission status was agreed to be Admission Status: observation status to the service of Dr. Walsh .               Assessment & Plan       Medical Decision Making  Tenosynovitis, cellulitis, arthritic changes    Patient is a 55-year-old female per emerged from no acute respiratory distress and vital signs unremarkable.  Given possibility of flexor tenosynovitis patient started on empiric antibiotics and discussed case with hand surgery.  Hand surgery recommended placement they would see in the a.m. for definitive management.  Patient agreeable disposition of admission.    Amount and/or Complexity of Data Reviewed  Labs: ordered.  Radiology: ordered.    Risk  Prescription drug management.  Decision regarding hospitalization.             Medications   ketorolac (TORADOL) injection 15 mg (15 mg Intravenous Given 2/22/25 1707)   vancomycin (VANCOCIN) 1,250 mg in sodium chloride 0.9 % 250 mL IVPB (has no administration in time range)   ampicillin-sulbactam (UNASYN) 3 g in sodium chloride 0.9 % 100 mL IVPB (3 g Intravenous New Bag 2/22/25 2247)       ED Risk Strat Scores                            SBIRT 22yo+      Flowsheet Row Most Recent Value   Initial Alcohol Screen: US AUDIT-C     1. How often do you have a drink containing alcohol? 0 Filed at: 02/22/2025 1507   2. How many drinks containing alcohol do you have on  a typical day you are drinking?  0 Filed at: 02/22/2025 1503   3a. Male UNDER 65: How often do you have five or more drinks on one occasion? 0 Filed at: 02/22/2025 1503   3b. FEMALE Any Age, or MALE 65+: How often do you have 4 or more drinks on one occassion? 0 Filed at: 02/22/2025 1509   Audit-C Score 0 Filed at: 02/22/2025 1507   CURTIS: How many times in the past year have you...    Used an illegal drug or used a prescription medication for non-medical reasons? Never Filed at: 02/22/2025 1507                            History of Present Illness       Chief Complaint   Patient presents with    Hand Swelling     Pt report L hand swelling that started yesterday, denies injury to the hand, c/o pain from the swelling.       Past Medical History:   Diagnosis Date    Hypertelorism       History reviewed. No pertinent surgical history.   History reviewed. No pertinent family history.   Social History     Tobacco Use    Smoking status: Never    Smokeless tobacco: Never   Vaping Use    Vaping status: Never Used   Substance Use Topics    Alcohol use: Never    Drug use: Never      E-Cigarette/Vaping    E-Cigarette Use Never User       E-Cigarette/Vaping Substances      I have reviewed and agree with the history as documented.     HPI    Patient is a 55-year-old female present emerged department for hand pain.  Patient has sudden onset left-sided hand discomfort and swelling.  She visited her primary care who started on antibiotics.  Patient reports the hand being red and warm to touch.  Pain has gotten worse over the course the last 2 days and came in for further management.  Denies any fevers or chills.  No other reported history.    Review of Systems   Constitutional:  Negative for chills and fever.   HENT:  Negative for ear pain and sore throat.    Eyes:  Negative for pain and visual disturbance.   Respiratory:  Negative for cough and shortness of breath.    Cardiovascular:  Negative for chest pain and palpitations.    Gastrointestinal:  Negative for abdominal pain and vomiting.   Genitourinary:  Negative for dysuria and hematuria.   Musculoskeletal:  Negative for arthralgias and back pain.   Skin:  Positive for rash. Negative for color change.   Neurological:  Negative for seizures and syncope.   All other systems reviewed and are negative.          Objective       ED Triage Vitals   Temperature Pulse Blood Pressure Respirations SpO2 Patient Position - Orthostatic VS   02/22/25 1505 02/22/25 1505 02/22/25 1505 02/22/25 1505 02/22/25 1505 02/22/25 1505   97.6 °F (36.4 °C) 75 (!) 183/97 20 100 % Sitting      Temp Source Heart Rate Source BP Location FiO2 (%) Pain Score    02/22/25 1505 02/22/25 1505 02/22/25 1505 -- 02/22/25 1707    Temporal Monitor Right arm  7      Vitals      Date and Time Temp Pulse SpO2 Resp BP Pain Score FACES Pain Rating User   02/24/25 0828 -- -- -- -- 135/91 No Pain -- ML   02/24/25 0526 -- -- -- -- -- No Pain -- BM   02/24/25 0459 -- -- -- -- -- No Pain -- BM   02/24/25 0103 -- -- -- -- -- No Pain --    02/23/25 2256 -- -- 99 % -- -- No Pain --    02/23/25 2100 98.5 °F (36.9 °C) 64 99 % -- 135/84 -- -- BR   02/23/25 1817 -- -- -- -- -- 3 -- PS   02/23/25 1236 -- -- -- -- -- 3 -- PS   02/23/25 0930 -- -- -- -- -- 3 -- PS   02/23/25 0653 -- 64 97 % -- 154/96 -- -- DII   02/23/25 0102 97.9 °F (36.6 °C) -- -- 18 182/100 3 -- BM   02/23/25 0102 -- 62 97 % -- -- -- -- DII   02/23/25 0005 -- -- -- -- -- 3 -- BM   02/22/25 2300 -- -- 100 % -- -- 3 -- BM   02/22/25 1743 -- -- -- -- -- 5 -- CC   02/22/25 1707 -- -- -- -- -- 7 -- EN   02/22/25 1700 -- -- -- -- 177/103 -- -- EN   02/22/25 1633 -- -- -- -- 198/109 -- -- EN   02/22/25 1630 -- -- -- -- 222/118 -- -- EN   02/22/25 1600 -- 67 100 % 18 197/103 -- -- EN   02/22/25 1547 -- 68 100 % -- 183/109 -- -- KD   02/22/25 1505 97.6 °F (36.4 °C) 75 100 % 20 183/97 -- -- CO            Physical Exam  Vitals and nursing note reviewed.   Constitutional:        General: She is not in acute distress.     Appearance: She is well-developed.   HENT:      Head: Normocephalic and atraumatic.   Eyes:      Conjunctiva/sclera: Conjunctivae normal.   Cardiovascular:      Rate and Rhythm: Normal rate and regular rhythm.      Heart sounds: No murmur heard.  Pulmonary:      Effort: Pulmonary effort is normal. No respiratory distress.      Breath sounds: Normal breath sounds.   Abdominal:      Palpations: Abdomen is soft.      Tenderness: There is no abdominal tenderness.   Musculoskeletal:         General: No swelling.      Cervical back: Neck supple.   Skin:     General: Skin is warm and dry.      Capillary Refill: Capillary refill takes less than 2 seconds.      Comments: Localized left hand swelling with warmth and skin color changes.   Neurological:      General: No focal deficit present.      Mental Status: She is alert and oriented to person, place, and time.   Psychiatric:         Mood and Affect: Mood normal.         Results Reviewed       Procedure Component Value Units Date/Time    Procalcitonin [[id number]]  (Normal) Collected: 02/22/25 2246    Lab Status: Final result Specimen: Blood from Arm, Right Updated: 02/22/25 2321     Procalcitonin <0.05 ng/ml     Lactic acid, plasma (w/reflex if result > 2.0) [[id number]]  (Normal) Collected: 02/22/25 2246    Lab Status: Final result Specimen: Blood from Arm, Right Updated: 02/22/25 2311     LACTIC ACID 0.9 mmol/L     Narrative:      Result may be elevated if tourniquet was used during collection.    Comprehensive metabolic panel [[id number]]  (Abnormal) Collected: 02/22/25 1636    Lab Status: Final result Specimen: Blood from Arm, Left Updated: 02/22/25 1708     Sodium 139 mmol/L      Potassium 3.8 mmol/L      Chloride 103 mmol/L      CO2 29 mmol/L      ANION GAP 7 mmol/L      BUN 19 mg/dL      Creatinine 1.11 mg/dL      Glucose 88 mg/dL      Calcium 10.3 mg/dL      AST 23 U/L      ALT 21 U/L      Alkaline Phosphatase 106 U/L       Total Protein 7.8 g/dL      Albumin 4.4 g/dL      Total Bilirubin 0.65 mg/dL      eGFR 56 ml/min/1.73sq m     Narrative:      National Kid[employer] Disease Foundation guidelines for Chronic Kidney Disease (CKD):     Stage 1 with normal or high GFR (GFR > 90 mL/min/1.73 square meters)    Stage 2 Mild CKD (GFR = 60-89 mL/min/1.73 square meters)    Stage 3A Moderate CKD (GFR = 45-59 mL/min/1.73 square meters)    Stage 3B Moderate CKD (GFR = 30-44 mL/min/1.73 square meters)    Stage 4 Severe CKD (GFR = 15-29 mL/min/1.73 square meters)    Stage 5 End Stage CKD (GFR <15 mL/min/1.73 square meters)  Note: GFR calculation is accurate only with a steady state creatinine    CBC and differential [[id number]]  (Abnormal) Collected: 02/22/25 1636    Lab Status: Final result Specimen: Blood from Arm, Left Updated: 02/22/25 1643     WBC 7.69 Thousand/uL      RBC 4.62 Million/uL      Hemoglobin 11.9 g/dL      Hematocrit 37.3 %      MCV 81 fL      MCH 25.8 pg      MCHC 31.9 g/dL      RDW 13.5 %      MPV 9.4 fL      Platelets 333 Thousands/uL      nRBC 0 /100 WBCs      Segmented % 57 %      Immature Grans % 0 %      Lymphocytes % 32 %      Monocytes % 8 %      Eosinophils Relative 2 %      Basophils Relative 1 %      Absolute Neutrophils 4.44 Thousands/µL      Absolute Immature Grans 0.01 Thousand/uL      Absolute Lymphocytes 2.46 Thousands/µL      Absolute Monocytes 0.58 Thousand/µL      Eosinophils Absolute 0.13 Thousand/µL      Basophils Absolute 0.07 Thousands/µL             CT upper extremity w contrast left   Final Interpretation by Polo Vora DO (02/22 2059)      Tenosynovitis of the fourth extensor compartment.      Subcutaneous edema along the dorsum of the hand suggesting cellulitis.      The study was marked in EPIC for immediate notification.      Workstation performed: [id number]         XR hand 3+ views LEFT   Final Interpretation by Percy Wood MD (02/23 1117)      No acute osseous abnormality.         Computerized  Assisted Algorithm (CAA) may have been used to analyze all applicable images.         Workstation performed: [id number]             Procedures    ED Medication and Procedure Management   Prior to Admission Medications   Prescriptions Last Dose Informant Patient Reported? Taking?   amLODIPine (NORVASC) 5 mg tablet 2/22/2025 Morning  Yes Yes   Sig: Take 5 mg by mouth daily   enalapril (VASOTEC) 20 mg tablet   Yes No   Sig: Take 20 mg by mouth daily   hydroCHLOROthiazide 25 mg tablet   Yes Yes   Sig: Take 25 mg by mouth 3 (three) times a day      Facility-Administered Medications: None     Discharge Medication List as of 2/24/2025 10:53 AM        START taking these medications    Details   cefpodoxime (VANTIN) 200 mg tablet Take 1 tablet (200 mg total) by mouth 2 (two) times a day for 10 days, Starting Mon 2/24/2025, Until Thu 3/6/2025, Normal      sulfamethoxazole-trimethoprim (BACTRIM DS) 800-160 mg per tablet Take 1 tablet by mouth every 12 (twelve) hours for 10 days, Starting Mon 2/24/2025, Until Thu 3/6/2025, Normal           CONTINUE these medications which have NOT CHANGED    Details   amLODIPine (NORVASC) 5 mg tablet Take 5 mg by mouth daily, Historical Med      hydroCHLOROthiazide 25 mg tablet Take 25 mg by mouth 3 (three) times a day, Starting Sat 11/16/2024, Historical Med      enalapril (VASOTEC) 20 mg tablet Take 20 mg by mouth daily, Historical Med           No discharge procedures on file.  ED SEPSIS DOCUMENTATION   Time reflects when diagnosis was documented in both MDM as applicable and the Disposition within this note       Time User Action Codes Description Comment    2/22/2025 10:37 PM Kerry Murray [M79.89] Hand swelling     2/22/2025 10:38 PM Kerry Murray [L03.90] Cellulitis     2/23/2025 12:00 AM Ruby Dudley [M65.949] Flexor tenosynovitis of finger     2/23/2025 12:01 AM Ruby Dudley [M65.90] Tenosynovitis                  Kerry Murray DO  03/10/25 1937